# Patient Record
Sex: MALE | Race: WHITE | NOT HISPANIC OR LATINO | Employment: OTHER | URBAN - METROPOLITAN AREA
[De-identification: names, ages, dates, MRNs, and addresses within clinical notes are randomized per-mention and may not be internally consistent; named-entity substitution may affect disease eponyms.]

---

## 2017-03-23 ENCOUNTER — CONVERSION ENCOUNTER (OUTPATIENT)
Dept: OTHER | Facility: HOSPITAL | Age: 72
End: 2017-03-23

## 2017-07-24 ENCOUNTER — HOSPITAL ENCOUNTER (OUTPATIENT)
Dept: OTHER | Facility: HOSPITAL | Age: 72
Discharge: HOME | End: 2017-07-24
Attending: INTERNAL MEDICINE | Admitting: INTERNAL MEDICINE

## 2017-12-11 ENCOUNTER — OFFICE (AMBULATORY)
Dept: URBAN - METROPOLITAN AREA CLINIC 64 | Facility: CLINIC | Age: 72
End: 2017-12-11

## 2017-12-11 VITALS
HEIGHT: 71 IN | SYSTOLIC BLOOD PRESSURE: 131 MMHG | HEART RATE: 80 BPM | WEIGHT: 154 LBS | DIASTOLIC BLOOD PRESSURE: 80 MMHG

## 2017-12-11 DIAGNOSIS — K62.5 HEMORRHAGE OF ANUS AND RECTUM: ICD-10-CM

## 2017-12-11 LAB
CBC WITH DIFFERENTIAL/PLATELET: BASO (ABSOLUTE): 0 X10E3/UL (ref 0–0.2)
CBC WITH DIFFERENTIAL/PLATELET: BASOS: 0 %
CBC WITH DIFFERENTIAL/PLATELET: EOS (ABSOLUTE): 0.1 X10E3/UL (ref 0–0.4)
CBC WITH DIFFERENTIAL/PLATELET: EOS: 2 %
CBC WITH DIFFERENTIAL/PLATELET: HEMATOCRIT: 39.9 % (ref 37.5–51)
CBC WITH DIFFERENTIAL/PLATELET: HEMATOLOGY COMMENTS: (no result)
CBC WITH DIFFERENTIAL/PLATELET: HEMOGLOBIN: 13 G/DL (ref 13–17.7)
CBC WITH DIFFERENTIAL/PLATELET: IMMATURE CELLS: (no result)
CBC WITH DIFFERENTIAL/PLATELET: IMMATURE GRANS (ABS): 0 X10E3/UL (ref 0–0.1)
CBC WITH DIFFERENTIAL/PLATELET: IMMATURE GRANULOCYTES: 0 %
CBC WITH DIFFERENTIAL/PLATELET: LYMPHS (ABSOLUTE): 1 X10E3/UL (ref 0.7–3.1)
CBC WITH DIFFERENTIAL/PLATELET: LYMPHS: 17 %
CBC WITH DIFFERENTIAL/PLATELET: MCH: 34.6 PG — HIGH (ref 26.6–33)
CBC WITH DIFFERENTIAL/PLATELET: MCHC: 32.6 G/DL (ref 31.5–35.7)
CBC WITH DIFFERENTIAL/PLATELET: MCV: 106 FL — HIGH (ref 79–97)
CBC WITH DIFFERENTIAL/PLATELET: MONOCYTES(ABSOLUTE): 0.5 X10E3/UL (ref 0.1–0.9)
CBC WITH DIFFERENTIAL/PLATELET: MONOCYTES: 9 %
CBC WITH DIFFERENTIAL/PLATELET: NEUTROPHILS (ABSOLUTE): 4.2 X10E3/UL (ref 1.4–7)
CBC WITH DIFFERENTIAL/PLATELET: NEUTROPHILS: 72 %
CBC WITH DIFFERENTIAL/PLATELET: NRBC: (no result)
CBC WITH DIFFERENTIAL/PLATELET: PLATELETS: 126 X10E3/UL — LOW (ref 150–379)
CBC WITH DIFFERENTIAL/PLATELET: RBC: 3.76 X10E6/UL — LOW (ref 4.14–5.8)
CBC WITH DIFFERENTIAL/PLATELET: RDW: 13 % (ref 12.3–15.4)
CBC WITH DIFFERENTIAL/PLATELET: WBC: 5.8 X10E3/UL (ref 3.4–10.8)
COMP. METABOLIC PANEL (14): A/G RATIO: 1.9 (ref 1.2–2.2)
COMP. METABOLIC PANEL (14): ALBUMIN, SERUM: 4.7 G/DL (ref 3.5–4.8)
COMP. METABOLIC PANEL (14): ALKALINE PHOSPHATASE, S: 88 IU/L (ref 39–117)
COMP. METABOLIC PANEL (14): ALT (SGPT): 17 IU/L (ref 0–44)
COMP. METABOLIC PANEL (14): AST (SGOT): 26 IU/L (ref 0–40)
COMP. METABOLIC PANEL (14): BILIRUBIN, TOTAL: 0.4 MG/DL (ref 0–1.2)
COMP. METABOLIC PANEL (14): BUN/CREATININE RATIO: 21 (ref 10–24)
COMP. METABOLIC PANEL (14): BUN: 28 MG/DL — HIGH (ref 8–27)
COMP. METABOLIC PANEL (14): CALCIUM, SERUM: 9.5 MG/DL (ref 8.6–10.2)
COMP. METABOLIC PANEL (14): CARBON DIOXIDE, TOTAL: 26 MMOL/L (ref 18–29)
COMP. METABOLIC PANEL (14): CHLORIDE, SERUM: 101 MMOL/L (ref 96–106)
COMP. METABOLIC PANEL (14): CREATININE, SERUM: 1.34 MG/DL — HIGH (ref 0.76–1.27)
COMP. METABOLIC PANEL (14): EGFR IF AFRICN AM: 62 ML/MIN/1.73 (ref 59–?)
COMP. METABOLIC PANEL (14): EGFR IF NONAFRICN AM: 53 ML/MIN/1.73 — LOW (ref 59–?)
COMP. METABOLIC PANEL (14): GLOBULIN, TOTAL: 2.5 G/DL (ref 1.5–4.5)
COMP. METABOLIC PANEL (14): GLUCOSE, SERUM: 96 MG/DL (ref 65–99)
COMP. METABOLIC PANEL (14): POTASSIUM, SERUM: 4.3 MMOL/L (ref 3.5–5.2)
COMP. METABOLIC PANEL (14): PROTEIN, TOTAL, SERUM: 7.2 G/DL (ref 6–8.5)
COMP. METABOLIC PANEL (14): SODIUM, SERUM: 143 MMOL/L (ref 134–144)

## 2017-12-11 PROCEDURE — 99202 OFFICE O/P NEW SF 15 MIN: CPT | Performed by: NURSE PRACTITIONER

## 2017-12-11 RX ORDER — HYDROCORTISONE 25 MG/G
5 CREAM TOPICAL
Qty: 1 | Refills: 1 | Status: COMPLETED
Start: 2017-12-11 | End: 2018-10-18

## 2017-12-14 ENCOUNTER — HOSPITAL ENCOUNTER (OUTPATIENT)
Dept: CT IMAGING | Facility: HOSPITAL | Age: 72
Discharge: HOME | End: 2017-12-14
Attending: NURSE PRACTITIONER | Admitting: NURSE PRACTITIONER

## 2018-01-25 ENCOUNTER — OFFICE (AMBULATORY)
Dept: URBAN - METROPOLITAN AREA CLINIC 64 | Facility: CLINIC | Age: 73
End: 2018-01-25
Payer: COMMERCIAL

## 2018-01-25 VITALS
DIASTOLIC BLOOD PRESSURE: 80 MMHG | SYSTOLIC BLOOD PRESSURE: 137 MMHG | HEIGHT: 71 IN | WEIGHT: 148 LBS | HEART RATE: 66 BPM

## 2018-01-25 DIAGNOSIS — K62.5 HEMORRHAGE OF ANUS AND RECTUM: ICD-10-CM

## 2018-01-25 DIAGNOSIS — K59.00 CONSTIPATION, UNSPECIFIED: ICD-10-CM

## 2018-01-25 PROCEDURE — 99212 OFFICE O/P EST SF 10 MIN: CPT | Performed by: INTERNAL MEDICINE

## 2018-03-29 ENCOUNTER — CONVERSION ENCOUNTER (OUTPATIENT)
Dept: OTHER | Facility: HOSPITAL | Age: 73
End: 2018-03-29

## 2018-10-19 ENCOUNTER — ON CAMPUS - OUTPATIENT (AMBULATORY)
Dept: URBAN - METROPOLITAN AREA HOSPITAL 2 | Facility: HOSPITAL | Age: 73
End: 2018-10-19

## 2018-10-19 VITALS
SYSTOLIC BLOOD PRESSURE: 145 MMHG | DIASTOLIC BLOOD PRESSURE: 72 MMHG | HEIGHT: 71 IN | WEIGHT: 177 LBS | HEART RATE: 77 BPM | DIASTOLIC BLOOD PRESSURE: 76 MMHG | HEART RATE: 75 BPM | HEART RATE: 92 BPM | TEMPERATURE: 97.3 F | SYSTOLIC BLOOD PRESSURE: 119 MMHG | SYSTOLIC BLOOD PRESSURE: 104 MMHG | DIASTOLIC BLOOD PRESSURE: 79 MMHG | DIASTOLIC BLOOD PRESSURE: 78 MMHG | HEART RATE: 78 BPM | HEART RATE: 85 BPM | DIASTOLIC BLOOD PRESSURE: 68 MMHG | RESPIRATION RATE: 16 BRPM | RESPIRATION RATE: 19 BRPM | SYSTOLIC BLOOD PRESSURE: 111 MMHG | DIASTOLIC BLOOD PRESSURE: 81 MMHG | HEART RATE: 79 BPM | DIASTOLIC BLOOD PRESSURE: 67 MMHG | OXYGEN SATURATION: 95 % | SYSTOLIC BLOOD PRESSURE: 150 MMHG | DIASTOLIC BLOOD PRESSURE: 73 MMHG | RESPIRATION RATE: 18 BRPM | SYSTOLIC BLOOD PRESSURE: 110 MMHG | SYSTOLIC BLOOD PRESSURE: 117 MMHG | OXYGEN SATURATION: 96 % | HEART RATE: 89 BPM | SYSTOLIC BLOOD PRESSURE: 128 MMHG | SYSTOLIC BLOOD PRESSURE: 122 MMHG | OXYGEN SATURATION: 98 %

## 2018-10-19 DIAGNOSIS — Z86.010 PERSONAL HISTORY OF COLONIC POLYPS: ICD-10-CM

## 2018-10-19 DIAGNOSIS — K57.30 DIVERTICULOSIS OF LARGE INTESTINE WITHOUT PERFORATION OR ABS: ICD-10-CM

## 2018-10-19 PROCEDURE — 45378 DIAGNOSTIC COLONOSCOPY: CPT | Performed by: INTERNAL MEDICINE

## 2019-03-29 ENCOUNTER — CONVERSION ENCOUNTER (OUTPATIENT)
Dept: OTHER | Facility: HOSPITAL | Age: 74
End: 2019-03-29

## 2019-06-04 VITALS
BODY MASS INDEX: 22 KG/M2 | WEIGHT: 157.13 LBS | HEART RATE: 74 BPM | DIASTOLIC BLOOD PRESSURE: 78 MMHG | HEIGHT: 71 IN | HEART RATE: 87 BPM | DIASTOLIC BLOOD PRESSURE: 71 MMHG | WEIGHT: 140 LBS | HEIGHT: 71 IN | SYSTOLIC BLOOD PRESSURE: 119 MMHG | HEART RATE: 91 BPM | BODY MASS INDEX: 24.69 KG/M2 | DIASTOLIC BLOOD PRESSURE: 80 MMHG | SYSTOLIC BLOOD PRESSURE: 118 MMHG | WEIGHT: 176.38 LBS | SYSTOLIC BLOOD PRESSURE: 136 MMHG

## 2019-06-12 ENCOUNTER — DOCUMENTATION (OUTPATIENT)
Dept: CARDIOLOGY | Facility: CLINIC | Age: 74
End: 2019-06-12

## 2019-06-12 DIAGNOSIS — E78.2 MIXED HYPERLIPIDEMIA: Primary | ICD-10-CM

## 2019-06-12 DIAGNOSIS — I10 ESSENTIAL HYPERTENSION: ICD-10-CM

## 2019-06-12 DIAGNOSIS — I25.10 CORONARY ARTERY DISEASE DUE TO LIPID RICH PLAQUE: ICD-10-CM

## 2019-06-12 DIAGNOSIS — G47.33 OSA (OBSTRUCTIVE SLEEP APNEA): ICD-10-CM

## 2019-06-12 DIAGNOSIS — I25.83 CORONARY ARTERY DISEASE DUE TO LIPID RICH PLAQUE: ICD-10-CM

## 2019-06-12 DIAGNOSIS — I27.20 PULMONARY HYPERTENSION (HCC): ICD-10-CM

## 2019-06-12 DIAGNOSIS — I35.0 NONRHEUMATIC AORTIC VALVE STENOSIS: Primary | ICD-10-CM

## 2019-06-12 DIAGNOSIS — I35.0 AORTIC STENOSIS, SEVERE: ICD-10-CM

## 2019-06-12 NOTE — PROGRESS NOTES
"  Vitals:  Weight: 178 lbs  Height: 5'6\"  /72  HR: 88 bpm  Resp: 18    Subjective:     Encounter Date:06/12/2019      Patient ID: Berto Cain is a 74 y.o. male.    Chief Complaint:  No chief complaint on file.      HPI:  Berto is a 74-year-old male patient of mine.  He previously was diagnosed with critical aortic stenosis and underwent transcatheter aortic valve replacement by me 6/2/2016.  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  His coronary artery risk factors are significant for dyslipidemia diabetes mellitus hypertension.  He also has a history of obstructive sleep apnea.    6/29/2018 he was complaining of dizziness.  He underwent a ZIO Patch 14-day evaluation and was found to have 11 runs of supraventricular tachycardia the worst 4 beats total.  This was determined to be a medically managed arrhythmia.  He underwent echocardiographic evaluation in 10/24/2018 which I personally reviewed: He had an ejection fraction of 60 to 65% with normal RV mild left atrial enlargement with normal bioprosthetic leaflet mobility with a mild paravalvular leak.    He returns today for follow-up.  He has mild exertional dyspnea.  His dyspnea occurs only with exertion and resolves with rest.  He has no associated chest pain or diaphoresis.    The following portions of the patient's history were reviewed and updated as appropriate: allergies, current medications, past family history, past medical history, past social history, past surgical history and problem list.    Problem List:  Patient Active Problem List   Diagnosis   • ISIDRA (obstructive sleep apnea)   • Diabetes mellitus (CMS/HCC)   • Hyperlipidemia   • Hypertension   • Pulmonary hypertension (CMS/HCC)   • Coronary artery disease due to lipid rich plaque   • Aortic stenosis, severe       Past Medical History:  Past Medical History:   Diagnosis Date   • Cancer (CMS/HCC)     Leukemia   • Diabetes mellitus (CMS/HCC)    • " History of cardiac monitoring 06/29/2018    Underlying NSR, Avg HR 91 bpm, 11 SVT runs with the fastest lasting 4 beats at 250 bpm.   • History of echocardiogram 10/24/2018    TTE showed EF 60-65%, Normal RV, Mild LAE, TAVR leaflets appear to move well, mild paul-valvular regurg, mild MR/TR, RVSP 35 mmHg.   • Hyperlipidemia    • Hypertension    • ISIDRA (obstructive sleep apnea)    • Pulmonary hypertension (CMS/HCC)        Past Surgical History:  Past Surgical History:   Procedure Laterality Date   • CARDIAC VALVE REPLACEMENT  06/02/2016    TAVR-Dr. Maldonado   • LIVER BIOPSY     • TONSILLECTOMY         Social History:  Social History     Socioeconomic History   • Marital status:      Spouse name: Not on file   • Number of children: Not on file   • Years of education: Not on file   • Highest education level: Not on file   Tobacco Use   • Smoking status: Never Smoker   • Smokeless tobacco: Never Used   Substance and Sexual Activity   • Alcohol use: No   • Drug use: No       Allergies:  Allergies no known allergies      ROS:    Review of Symptoms:  Constitutional: Patient afebrile no chills or unexpected weight changes  Respiratory: No cough, no wheezing or dyspnea  Cardiovascular: No chest pain, palpitations, +dyspnea, orthopnea and no edema  Gastrointestinal: No nausea, vomiting, constipation or diarrhea.  No melena or dark stools    All other systems reviewed and are negative         Objective:         There were no vitals taken for this visit.    Physical exam  Constitutional: well-nourished, and appears stated age in no acute distress  PERRL: Conjunctiva clear, no pallor, anicteric  HENMT: normocephalic, normal dentition, no cyanosis or pallor  Neck:no bruits, or thrills and bilateral normal carotid upstroke. Normal jugular venous pressure  Cardiovascular: No parasternal heaves an non-displaced focal PMI. Normal rate and rhythm: no rub, gallop,II/VI JOY with loud S2 and normal S1; no lower or upper extremity  edema.   Lungs: unlabored, no wheezing with no rales or rhonchi on auscultation.  Extremities: Warm, no clubbing, cyanosis, or edema. Full and equal peripheral pulses in extremities with no bruits appreciated.   Abdomen: soft, non-tender, non-distended  Musculoskeletal: no joint tenderness or swelling and no erythema  Skin: Warm and dry, non-erythematous   Neuro:alert and normal affect. Oriented to time, place and person.         In-Office Procedure(s):  Procedures    ASCVD RIsk Score::  The ASCVD Risk score (Oren KASH Maki, et al., 2013) failed to calculate for the following reasons:    Cannot find a previous HDL lab    Cannot find a previous total cholesterol lab    Recent Radiology:  Imaging Results (most recent)     None          Lab Review:   No visits with results within 2 Month(s) from this visit.   Latest known visit with results is:   Admission on 12/08/2017, Discharged on 12/08/2017   Component Date Value   • WBC 12/08/2017 6.7    • RBC 12/08/2017 3.63*   • Hemoglobin 12/08/2017 12.9*   • Hematocrit 12/08/2017 38.2*   • MCV 12/08/2017 105.0*   • MCH 12/08/2017 35.5*   • MCHC 12/08/2017 33.8    • RDW 12/08/2017 12.8    • Platelets 12/08/2017 90*   • MPV 12/08/2017 7.5    • Differential Type 12/08/2017 AUTO    • Neutrophils Absolute 12/08/2017 5.2    • Lymphocytes Absolute 12/08/2017 0.6*   • Monocytes Absolute 12/08/2017 0.8    • Eosinophils Absolute 12/08/2017 0.2    • Basophils Absolute 12/08/2017 0.0    • Neutrophil Rel % 12/08/2017 78*   • Lymphocyte Rel % 12/08/2017 9*   • Monocyte Rel % 12/08/2017 11    • Eosinophil Rel % 12/08/2017 2    • Basophil Rel % 12/08/2017 0    • nRBC 12/08/2017 0    • Absolute nRBC 12/08/2017 0    • Sodium 12/08/2017 139    • Potassium 12/08/2017 3.8    • Chloride 12/08/2017 103    • CO2 12/08/2017 28    • Glucose 12/08/2017 86    • BUN 12/08/2017 20    • Creatinine 12/08/2017 1.2    • Anion Gap 12/08/2017 11.8    • BUN/Creatinine Ratio 12/08/2017 16.7    • GFR MDRD Non   [place]* 12/08/2017 60*   • GFR MDRD  12/08/2017 >60    • Calcium 12/08/2017 9.3    • Protime 12/08/2017 10.5    • INR 12/08/2017 1.0    • PTT 12/08/2017 24.9               Invalid input(s): ALKPO4                        Invalid input(s): LDLCALC                Assessment:          Diagnosis Plan   1. Mixed hyperlipidemia     2. Essential hypertension     3. Pulmonary hypertension (CMS/HCC)     4. ISIDRA (obstructive sleep apnea)     5. Coronary artery disease due to lipid rich plaque     6. Aortic stenosis, severe            Plan:         1. Mixed hyperlipidemia  Controlled on medical therapy    2. Essential hypertension  Well-controlled on medical therapy    3. Pulmonary hypertension (CMS/HCC)  Treating conservatively    4. ISIDRA (obstructive sleep apnea)  Using CPAP    5. Coronary artery disease due to lipid rich plaque  Clinically silent and nonobstructive with last cardiac catheterization data.  Continue medical therapy for primary prevention of ischemic heart disease    6. Aortic stenosis, severe  Stable status post transcatheter aortic valve replacement.  Given dyspnea we will repeat echo today.    Level of Care:                 Karson Maldonado MD  06/12/19  .

## 2019-06-19 ENCOUNTER — HOSPITAL ENCOUNTER (OUTPATIENT)
Dept: CARDIOLOGY | Facility: HOSPITAL | Age: 74
Discharge: HOME | End: 2019-06-19
Admitting: INTERNAL MEDICINE

## 2019-06-19 VITALS
HEIGHT: 70 IN | DIASTOLIC BLOOD PRESSURE: 67 MMHG | WEIGHT: 175 LBS | SYSTOLIC BLOOD PRESSURE: 119 MMHG | BODY MASS INDEX: 25.05 KG/M2

## 2019-06-19 DIAGNOSIS — I35.0 NONRHEUMATIC AORTIC VALVE STENOSIS: ICD-10-CM

## 2019-06-19 PROCEDURE — 93306 TTE W/DOPPLER COMPLETE: CPT | Performed by: INTERNAL MEDICINE

## 2019-06-19 PROCEDURE — 93306 TTE W/DOPPLER COMPLETE: CPT

## 2019-06-23 LAB
BH CV ECHO MEAS - AO MAX PG (FULL): 31 MMHG
BH CV ECHO MEAS - AO MAX PG: 34.2 MMHG
BH CV ECHO MEAS - AO MEAN PG (FULL): 17.8 MMHG
BH CV ECHO MEAS - AO MEAN PG: 19.6 MMHG
BH CV ECHO MEAS - AO ROOT AREA (BSA CORRECTED): 1.3
BH CV ECHO MEAS - AO ROOT AREA: 5.4 CM^2
BH CV ECHO MEAS - AO ROOT DIAM: 2.6 CM
BH CV ECHO MEAS - AO V2 MAX: 292.5 CM/SEC
BH CV ECHO MEAS - AO V2 MEAN: 211.8 CM/SEC
BH CV ECHO MEAS - AO V2 VTI: 68.3 CM
BH CV ECHO MEAS - AVA(I,A): 0.93 CM^2
BH CV ECHO MEAS - AVA(I,D): 0.93 CM^2
BH CV ECHO MEAS - AVA(V,A): 0.93 CM^2
BH CV ECHO MEAS - AVA(V,D): 0.93 CM^2
BH CV ECHO MEAS - BSA(HAYCOCK): 2 M^2
BH CV ECHO MEAS - BSA: 2 M^2
BH CV ECHO MEAS - BZI_BMI: 24.8 KILOGRAMS/M^2
BH CV ECHO MEAS - BZI_METRIC_HEIGHT: 180.3 CM
BH CV ECHO MEAS - BZI_METRIC_WEIGHT: 80.7 KG
BH CV ECHO MEAS - EDV(CUBED): 137.3 ML
BH CV ECHO MEAS - EDV(MOD-SP4): 58.7 ML
BH CV ECHO MEAS - EDV(TEICH): 127.1 ML
BH CV ECHO MEAS - EF(CUBED): 62.1 %
BH CV ECHO MEAS - EF(MOD-BP): 61 %
BH CV ECHO MEAS - EF(MOD-SP4): 61.2 %
BH CV ECHO MEAS - EF(TEICH): 53.3 %
BH CV ECHO MEAS - ESV(CUBED): 52.1 ML
BH CV ECHO MEAS - ESV(MOD-SP4): 22.8 ML
BH CV ECHO MEAS - ESV(TEICH): 59.4 ML
BH CV ECHO MEAS - FS: 27.6 %
BH CV ECHO MEAS - IVS/LVPW: 1.2
BH CV ECHO MEAS - IVSD: 1.2 CM
BH CV ECHO MEAS - LA DIMENSION(2D): 4.2 CM
BH CV ECHO MEAS - LV DIASTOLIC VOL/BSA (35-75): 29.3 ML/M^2
BH CV ECHO MEAS - LV MASS(C)D: 217.8 GRAMS
BH CV ECHO MEAS - LV MASS(C)DI: 108.5 GRAMS/M^2
BH CV ECHO MEAS - LV MAX PG: 3.3 MMHG
BH CV ECHO MEAS - LV MEAN PG: 1.8 MMHG
BH CV ECHO MEAS - LV SYSTOLIC VOL/BSA (12-30): 11.4 ML/M^2
BH CV ECHO MEAS - LV V1 MAX: 90.4 CM/SEC
BH CV ECHO MEAS - LV V1 MEAN: 61 CM/SEC
BH CV ECHO MEAS - LV V1 VTI: 21.1 CM
BH CV ECHO MEAS - LVIDD: 5.2 CM
BH CV ECHO MEAS - LVIDS: 3.7 CM
BH CV ECHO MEAS - LVOT AREA: 3 CM^2
BH CV ECHO MEAS - LVOT DIAM: 2 CM
BH CV ECHO MEAS - LVPWD: 1 CM
BH CV ECHO MEAS - MV A MAX VEL: 85.3 CM/SEC
BH CV ECHO MEAS - MV DEC SLOPE: 520.3 CM/SEC^2
BH CV ECHO MEAS - MV DEC TIME: 0.19 SEC
BH CV ECHO MEAS - MV E MAX VEL: 100.9 CM/SEC
BH CV ECHO MEAS - MV E/A: 1.2
BH CV ECHO MEAS - MV MAX PG: 5.7 MMHG
BH CV ECHO MEAS - MV MEAN PG: 2.2 MMHG
BH CV ECHO MEAS - MV V2 MAX: 119.2 CM/SEC
BH CV ECHO MEAS - MV V2 MEAN: 68.7 CM/SEC
BH CV ECHO MEAS - MV V2 VTI: 34 CM
BH CV ECHO MEAS - MVA(VTI): 1.9 CM^2
BH CV ECHO MEAS - PA MAX PG (FULL): 1.2 MMHG
BH CV ECHO MEAS - PA MAX PG: 3.7 MMHG
BH CV ECHO MEAS - PA V2 MAX: 96.2 CM/SEC
BH CV ECHO MEAS - PULM DIAS VEL: 68.1 CM/SEC
BH CV ECHO MEAS - PULM S/D: 1.3
BH CV ECHO MEAS - PULM SYS VEL: 85.6 CM/SEC
BH CV ECHO MEAS - PVA(V,A): 1.3 CM^2
BH CV ECHO MEAS - PVA(V,D): 1.3 CM^2
BH CV ECHO MEAS - QP/QS: 0.39
BH CV ECHO MEAS - RV MAX PG: 2.5 MMHG
BH CV ECHO MEAS - RV MEAN PG: 1.4 MMHG
BH CV ECHO MEAS - RV V1 MAX: 79.7 CM/SEC
BH CV ECHO MEAS - RV V1 MEAN: 55.6 CM/SEC
BH CV ECHO MEAS - RV V1 VTI: 16.4 CM
BH CV ECHO MEAS - RVDD: 2.2 CM
BH CV ECHO MEAS - RVOT AREA: 1.5 CM^2
BH CV ECHO MEAS - RVOT DIAM: 1.4 CM
BH CV ECHO MEAS - SI(AO): 183 ML/M^2
BH CV ECHO MEAS - SI(CUBED): 42.5 ML/M^2
BH CV ECHO MEAS - SI(LVOT): 31.6 ML/M^2
BH CV ECHO MEAS - SI(MOD-SP4): 17.9 ML/M^2
BH CV ECHO MEAS - SI(TEICH): 33.7 ML/M^2
BH CV ECHO MEAS - SV(AO): 367.3 ML
BH CV ECHO MEAS - SV(CUBED): 85.2 ML
BH CV ECHO MEAS - SV(LVOT): 63.4 ML
BH CV ECHO MEAS - SV(MOD-SP4): 36 ML
BH CV ECHO MEAS - SV(RVOT): 24.8 ML
BH CV ECHO MEAS - SV(TEICH): 67.7 ML
LEFT ATRIUM VOLUME INDEX: 29 ML/M2

## 2019-06-26 ENCOUNTER — TELEPHONE (OUTPATIENT)
Dept: CARDIOLOGY | Facility: CLINIC | Age: 74
End: 2019-06-26

## 2019-06-27 ENCOUNTER — TELEPHONE (OUTPATIENT)
Dept: CARDIOLOGY | Facility: CLINIC | Age: 74
End: 2019-06-27

## 2020-01-10 ENCOUNTER — OFFICE VISIT (OUTPATIENT)
Dept: CARDIOLOGY | Facility: CLINIC | Age: 75
End: 2020-01-10

## 2020-01-10 VITALS
HEART RATE: 86 BPM | BODY MASS INDEX: 25.2 KG/M2 | RESPIRATION RATE: 18 BRPM | HEIGHT: 70 IN | OXYGEN SATURATION: 93 % | WEIGHT: 176 LBS | DIASTOLIC BLOOD PRESSURE: 78 MMHG | SYSTOLIC BLOOD PRESSURE: 136 MMHG

## 2020-01-10 DIAGNOSIS — I27.20 PULMONARY HYPERTENSION (HCC): ICD-10-CM

## 2020-01-10 DIAGNOSIS — I35.0 AORTIC STENOSIS, SEVERE: Primary | ICD-10-CM

## 2020-01-10 DIAGNOSIS — Z95.2 AORTIC VALVE REPLACED: ICD-10-CM

## 2020-01-10 DIAGNOSIS — R07.89 CHEST PAIN, ATYPICAL: ICD-10-CM

## 2020-01-10 DIAGNOSIS — E78.2 MIXED HYPERLIPIDEMIA: ICD-10-CM

## 2020-01-10 DIAGNOSIS — I25.10 CORONARY ARTERY DISEASE DUE TO LIPID RICH PLAQUE: ICD-10-CM

## 2020-01-10 DIAGNOSIS — I10 ESSENTIAL HYPERTENSION: ICD-10-CM

## 2020-01-10 DIAGNOSIS — I25.83 CORONARY ARTERY DISEASE DUE TO LIPID RICH PLAQUE: ICD-10-CM

## 2020-01-10 PROCEDURE — 99214 OFFICE O/P EST MOD 30 MIN: CPT | Performed by: INTERNAL MEDICINE

## 2020-01-10 RX ORDER — HYDROCORTISONE 5 MG/1
5 TABLET ORAL DAILY
COMMUNITY
End: 2021-04-23

## 2020-01-10 RX ORDER — VIT C/B6/B5/MAGNESIUM/HERB 173 50-5-6-5MG
1 CAPSULE ORAL 2 TIMES DAILY
COMMUNITY

## 2020-01-11 PROBLEM — R07.89 CHEST PAIN, ATYPICAL: Status: ACTIVE | Noted: 2020-01-11

## 2020-01-11 NOTE — PROGRESS NOTES
Subjective:     Encounter Date:01/10/2020      Patient ID: Berto Cain is a 74 y.o. male.    Chief Complaint:  Chief Complaint   Patient presents with   • Coronary Artery Disease   • Hypertension   • Hyperlipidemia   • Cardiac Valve Problem     AS       HPI:  Berto is a 74-year-old male patient of mine.  He previously was diagnosed with critical aortic stenosis and underwent transcatheter aortic valve replacement by me 6/2/2016.  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  His coronary artery risk factors are significant for dyslipidemia diabetes mellitus hypertension.  He also has a history of obstructive sleep apnea.    6/29/2018 he was complaining of dizziness.  He underwent a ZIO Patch 14-day evaluation and was found to have 11 runs of supraventricular tachycardia the worst 4 beats total.  This was determined to be a medically managed arrhythmia.  He underwent echocardiographic evaluation in 10/24/2018 which I personally reviewed: He had an ejection fraction of 60 to 65% with normal RV mild left atrial enlargement with normal bioprosthetic leaflet mobility with a mild paravalvular leak.    Last visit he had mild exertional dyspnea.  His dyspnea occurs only with exertion and resolves with rest.  He has no associated chest pain or diaphoresis.    Today he presents with one episode of chest pain which is a new complaint to me.  It happened during sleep after rolling over lasted approximately 30 seconds and resolved.  Is not recurred.  He otherwise has no complaints from a cardiovascular standpoint.  I personally reviewed his echocardiogram from 6/19/2019 showed mild concentric left ventricular hypertrophy mild MR and TR with mild paravalvular leak around his transcatheter aortic valve.  His ejection fraction was 61%.    The following portions of the patient's history were reviewed and updated as appropriate: allergies, current medications, past family history, past  medical history, past social history and past surgical history.    Problem List:  Patient Active Problem List   Diagnosis   • ISIDRA (obstructive sleep apnea)   • Diabetes mellitus (CMS/HCC)   • Hyperlipidemia   • Hypertension   • Pulmonary hypertension (CMS/HCC)   • Coronary artery disease due to lipid rich plaque   • Aortic stenosis, severe   • Chest pain, atypical       Past Medical History:  Past Medical History:   Diagnosis Date   • Cancer (CMS/HCC)     Leukemia   • Chest pain, atypical 1/11/2020   • Diabetes mellitus (CMS/HCC)    • History of cardiac monitoring 06/29/2018    Underlying NSR, Avg HR 91 bpm, 11 SVT runs with the fastest lasting 4 beats at 250 bpm.   • History of echocardiogram 10/24/2018    TTE showed EF 60-65%, Normal RV, Mild LAE, TAVR leaflets appear to move well, mild paul-valvular regurg, mild MR/TR, RVSP 35 mmHg.   • History of echocardiogram 06/19/2019    Mild concentric LVH, Mild MR/TR, TAVR in place with mild paravalvular AI, EF 61%   • Hyperlipidemia    • Hypertension    • ISIDRA (obstructive sleep apnea)    • Pulmonary hypertension (CMS/HCC)        Past Surgical History:  Past Surgical History:   Procedure Laterality Date   • CARDIAC VALVE REPLACEMENT  06/02/2016    TAVR-Dr. Maldonado   • LIVER BIOPSY     • TONSILLECTOMY         Social History:  Social History     Socioeconomic History   • Marital status:      Spouse name: Not on file   • Number of children: Not on file   • Years of education: Not on file   • Highest education level: Not on file   Tobacco Use   • Smoking status: Never Smoker   • Smokeless tobacco: Never Used   Substance and Sexual Activity   • Alcohol use: No   • Drug use: No       Allergies:  No Known Allergies      Review of Symptoms:  Constitutional: Patient afebrile no chills or unexpected weight changes  Respiratory: No cough, no wheezing or dyspnea  Cardiovascular: No chest pain today, or palpitations, dyspnea, orthopnea and no edema  Gastrointestinal: No nausea,  "vomiting, constipation or diarrhea.  No melena or dark stools    All other systems reviewed and are negative           Objective:         /78 (BP Location: Left arm, Patient Position: Sitting, Cuff Size: Large Adult)   Pulse 86   Resp 18   Ht 177.8 cm (70\")   Wt 79.8 kg (176 lb)   SpO2 93%   BMI 25.25 kg/m²     Physical exam  Constitutional: well-nourished, and appears stated age in no acute distress  PERRL: Conjunctiva clear, no pallor, anicteric  HENMT: normocephalic, normal dentition, no cyanosis or pallor  Neck:no bruits, or thrills and bilateral normal carotid upstroke. Normal jugular venous pressure  Cardiovascular: No parasternal heaves an non-displaced focal PMI. Normal rate and rhythm: no rub, gallop, murmur or click and normal S1 and S2; no lower or upper extremity edema.   Lungs: unlabored, no wheezing with no rales or rhonchi on auscultation.  Extremities: Warm, no clubbing, cyanosis. Full and equal peripheral pulses in extremities with no bruits appreciated.   Abdomen: soft, non-tender, non-distended  Musculoskeletal: no joint tenderness or swelling and no erythema  Skin: Warm and dry, non-erythematous   Neuro:alert and normal affect. Oriented to time, place and person.       In-Office Procedure(s):  Procedures    ASCVD RIsk Score::  The ASCVD Risk score (Rockford DC Jr., et al., 2013) failed to calculate for the following reasons:    Cannot find a previous HDL lab    Cannot find a previous total cholesterol lab    Recent Radiology:  Imaging Results (Most Recent)     None          Lab Review:   No visits with results within 2 Month(s) from this visit.   Latest known visit with results is:   Hospital Outpatient Visit on 06/19/2019   Component Date Value   • BSA 06/19/2019 2.0    • BH CV ECHO JONNATHAN - RVDD 06/19/2019 2.2    • IVSd 06/19/2019 1.2    • LVIDd 06/19/2019 5.2    • LVIDs 06/19/2019 3.7    • LVPWd 06/19/2019 1.0    • IVS/LVPW 06/19/2019 1.2    • FS 06/19/2019 27.6    • EDV(Teich) " 06/19/2019 127.1    • ESV(Teich) 06/19/2019 59.4    • EF(Teich) 06/19/2019 53.3    • EDV(cubed) 06/19/2019 137.3    • ESV(cubed) 06/19/2019 52.1    • EF(cubed) 06/19/2019 62.1    • LV mass(C)d 06/19/2019 217.8    • LV mass(C)dI 06/19/2019 108.5    • SV(Teich) 06/19/2019 67.7    • SI(Teich) 06/19/2019 33.7    • SV(cubed) 06/19/2019 85.2    • SI(cubed) 06/19/2019 42.5    • Ao root diam 06/19/2019 2.6    • Ao root area 06/19/2019 5.4    • LVOT diam 06/19/2019 2.0    • LVOT area 06/19/2019 3.0    • RVOT diam 06/19/2019 1.4    • RVOT area 06/19/2019 1.5    • EDV(MOD-sp4) 06/19/2019 58.7    • ESV(MOD-sp4) 06/19/2019 22.8    • EF(MOD-sp4) 06/19/2019 61.2    • SV(MOD-sp4) 06/19/2019 36.0    • SI(MOD-sp4) 06/19/2019 17.9    • Ao root area (BSA correc* 06/19/2019 1.3    • LV Cornell Vol (BSA correct* 06/19/2019 29.3    • LV Sys Vol (BSA correcte* 06/19/2019 11.4    • MV E max surendra 06/19/2019 100.9    • MV A max surendra 06/19/2019 85.3    • MV E/A 06/19/2019 1.2    • MV V2 max 06/19/2019 119.2    • MV max PG 06/19/2019 5.7    • MV V2 mean 06/19/2019 68.7    • MV mean PG 06/19/2019 2.2    • MV V2 VTI 06/19/2019 34.0    • MVA(VTI) 06/19/2019 1.9    • MV dec slope 06/19/2019 520.3    • MV dec time 06/19/2019 0.19    • Ao pk surendra 06/19/2019 292.5    • Ao max PG 06/19/2019 34.2    • Ao max PG (full) 06/19/2019 31.0    • Ao V2 mean 06/19/2019 211.8    • Ao mean PG 06/19/2019 19.6    • Ao mean PG (full) 06/19/2019 17.8    • Ao V2 VTI 06/19/2019 68.3    • HAROLDO(I,A) 06/19/2019 0.93    • HAROLDO(I,D) 06/19/2019 0.93    • HAROLDO(V,A) 06/19/2019 0.93    • HAROLDO(V,D) 06/19/2019 0.93    • LV V1 max PG 06/19/2019 3.3    • LV V1 mean PG 06/19/2019 1.8    • LV V1 max 06/19/2019 90.4    • LV V1 mean 06/19/2019 61.0    • LV V1 VTI 06/19/2019 21.1    • SV(Ao) 06/19/2019 367.3    • SI(Ao) 06/19/2019 183.0    • SV(LVOT) 06/19/2019 63.4    • SV(RVOT) 06/19/2019 24.8    • SI(LVOT) 06/19/2019 31.6    • PA V2 max 06/19/2019 96.2    • PA max PG 06/19/2019 3.7    • PA max  PG (full) 06/19/2019 1.2    •  CV ECHO JONNATHAN - PVA(V,* 06/19/2019 1.3    •  CV ECHO JONNATHAN - PVA(V,* 06/19/2019 1.3    • RV V1 max PG 06/19/2019 2.5    • RV V1 mean PG 06/19/2019 1.4    • RV V1 max 06/19/2019 79.7    • RV V1 mean 06/19/2019 55.6    • RV V1 VTI 06/19/2019 16.4    • Pulm Sys Pérez 06/19/2019 85.6    • Pulm Cornell Pérez 06/19/2019 68.1    • Pulm S/D 06/19/2019 1.3    • Qp/Qs 06/19/2019 0.39    •  CV ECHO JONNATHAN - BZI_BMI 06/19/2019 24.8    •  CV ECHO JONNATHAN - BSA(HA* 06/19/2019 2.0    •  CV ECHO JONNATHAN - BZI_ME* 06/19/2019 80.7    •  CV ECHO JONNATHAN - BZI_ME* 06/19/2019 180.3    • LA Volume Index 06/19/2019 29.0    • EF(MOD-bp) 06/19/2019 61.0    • LA dimension(2D) 06/19/2019 4.2               Invalid input(s): ALKPO4                        Invalid input(s): LDLCALC                Assessment:          Diagnosis Plan   1. Aortic stenosis, severe  Adult Transthoracic Echo Complete W/ Cont if Necessary Per Protocol   2. Aortic valve replaced  Adult Transthoracic Echo Complete W/ Cont if Necessary Per Protocol   3. Coronary artery disease due to lipid rich plaque     4. Pulmonary hypertension (CMS/HCC)     5. Essential hypertension     6. Mixed hyperlipidemia     7. Chest pain, atypical            Plan:         1. Aortic stenosis, severe  Appears to be stable.  Repeat surveillance echo to evaluate paravalvular leak.  - Adult Transthoracic Echo Complete W/ Cont if Necessary Per Protocol; Future    2. Aortic valve replaced  See above  - Adult Transthoracic Echo Complete W/ Cont if Necessary Per Protocol; Future    3. Coronary artery disease due to lipid rich plaque  Chest pain likely noncardiac.  Continue medical therapy secondary prevention for the development of ischemic heart disease    4. Pulmonary hypertension (CMS/HCC)  Stable    5. Essential hypertension  Well-controlled on medical therapy    6. Mixed hyperlipidemia  Well-controlled on medical therapy    7. Chest pain, atypical  Atypical.  Treat  conservatively      Level of Care:                 Karson Maldonado MD  01/11/20  .

## 2020-01-16 ENCOUNTER — HOSPITAL ENCOUNTER (OUTPATIENT)
Dept: CARDIOLOGY | Facility: HOSPITAL | Age: 75
Discharge: HOME | End: 2020-01-16
Admitting: INTERNAL MEDICINE

## 2020-01-16 VITALS
WEIGHT: 177 LBS | SYSTOLIC BLOOD PRESSURE: 120 MMHG | DIASTOLIC BLOOD PRESSURE: 60 MMHG | BODY MASS INDEX: 24.78 KG/M2 | HEIGHT: 71 IN

## 2020-01-16 DIAGNOSIS — I35.0 AORTIC STENOSIS, SEVERE: ICD-10-CM

## 2020-01-16 DIAGNOSIS — Z95.2 AORTIC VALVE REPLACED: ICD-10-CM

## 2020-01-16 LAB
BH CV ECHO MEAS - % IVS THICK: 70.9 %
BH CV ECHO MEAS - % LVPW THICK: 41.9 %
BH CV ECHO MEAS - ACS: 1.5 CM
BH CV ECHO MEAS - AO MAX PG (FULL): 25.3 MMHG
BH CV ECHO MEAS - AO MAX PG: 29.7 MMHG
BH CV ECHO MEAS - AO MEAN PG (FULL): 13.8 MMHG
BH CV ECHO MEAS - AO MEAN PG: 16.3 MMHG
BH CV ECHO MEAS - AO ROOT AREA (BSA CORRECTED): 1.1
BH CV ECHO MEAS - AO ROOT AREA: 3.7 CM^2
BH CV ECHO MEAS - AO ROOT DIAM: 2.2 CM
BH CV ECHO MEAS - AO V2 MAX: 269.7 CM/SEC
BH CV ECHO MEAS - AO V2 MEAN: 185.5 CM/SEC
BH CV ECHO MEAS - AO V2 VTI: 52 CM
BH CV ECHO MEAS - AVA(I,A): 1.6 CM^2
BH CV ECHO MEAS - AVA(I,D): 1.6 CM^2
BH CV ECHO MEAS - AVA(V,A): 1.4 CM^2
BH CV ECHO MEAS - AVA(V,D): 1.4 CM^2
BH CV ECHO MEAS - BSA(HAYCOCK): 2 M^2
BH CV ECHO MEAS - BSA: 2 M^2
BH CV ECHO MEAS - BZI_BMI: 24.7 KILOGRAMS/M^2
BH CV ECHO MEAS - BZI_METRIC_HEIGHT: 180.3 CM
BH CV ECHO MEAS - BZI_METRIC_WEIGHT: 80.3 KG
BH CV ECHO MEAS - EDV(CUBED): 162.2 ML
BH CV ECHO MEAS - EDV(MOD-SP2): 59.2 ML
BH CV ECHO MEAS - EDV(MOD-SP4): 58.5 ML
BH CV ECHO MEAS - EDV(TEICH): 144.6 ML
BH CV ECHO MEAS - EF(CUBED): 70.3 %
BH CV ECHO MEAS - EF(MOD-BP): 56 %
BH CV ECHO MEAS - EF(MOD-SP2): 58.8 %
BH CV ECHO MEAS - EF(MOD-SP4): 54.3 %
BH CV ECHO MEAS - EF(TEICH): 61.4 %
BH CV ECHO MEAS - ESV(CUBED): 48.2 ML
BH CV ECHO MEAS - ESV(MOD-SP2): 24.4 ML
BH CV ECHO MEAS - ESV(MOD-SP4): 26.7 ML
BH CV ECHO MEAS - ESV(TEICH): 55.8 ML
BH CV ECHO MEAS - FS: 33.3 %
BH CV ECHO MEAS - IVS/LVPW: 0.91
BH CV ECHO MEAS - IVSD: 0.94 CM
BH CV ECHO MEAS - IVSS: 1.6 CM
BH CV ECHO MEAS - LA DIMENSION(2D): 3.7 CM
BH CV ECHO MEAS - LV DIASTOLIC VOL/BSA (35-75): 29.2 ML/M^2
BH CV ECHO MEAS - LV MASS(C)D: 205 GRAMS
BH CV ECHO MEAS - LV MASS(C)DI: 102.4 GRAMS/M^2
BH CV ECHO MEAS - LV MASS(C)S: 210.2 GRAMS
BH CV ECHO MEAS - LV MASS(C)SI: 105 GRAMS/M^2
BH CV ECHO MEAS - LV MAX PG: 4.4 MMHG
BH CV ECHO MEAS - LV MEAN PG: 2.5 MMHG
BH CV ECHO MEAS - LV SYSTOLIC VOL/BSA (12-30): 13.3 ML/M^2
BH CV ECHO MEAS - LV V1 MAX: 104.9 CM/SEC
BH CV ECHO MEAS - LV V1 MEAN: 75 CM/SEC
BH CV ECHO MEAS - LV V1 VTI: 23.1 CM
BH CV ECHO MEAS - LVIDD: 5.5 CM
BH CV ECHO MEAS - LVIDS: 3.6 CM
BH CV ECHO MEAS - LVOT AREA: 3.6 CM^2
BH CV ECHO MEAS - LVOT DIAM: 2.1 CM
BH CV ECHO MEAS - LVPWD: 1 CM
BH CV ECHO MEAS - LVPWS: 1.5 CM
BH CV ECHO MEAS - MV A MAX VEL: 92 CM/SEC
BH CV ECHO MEAS - MV DEC SLOPE: 423.7 CM/SEC^2
BH CV ECHO MEAS - MV DEC TIME: 0.16 SEC
BH CV ECHO MEAS - MV E MAX VEL: 67 CM/SEC
BH CV ECHO MEAS - MV E/A: 0.73
BH CV ECHO MEAS - MV MAX PG: 5.2 MMHG
BH CV ECHO MEAS - MV MEAN PG: 2.1 MMHG
BH CV ECHO MEAS - MV V2 MAX: 114.4 CM/SEC
BH CV ECHO MEAS - MV V2 MEAN: 67.2 CM/SEC
BH CV ECHO MEAS - MV V2 VTI: 22.8 CM
BH CV ECHO MEAS - MVA(VTI): 3.7 CM^2
BH CV ECHO MEAS - PA ACC TIME: 0.11 SEC
BH CV ECHO MEAS - PA MAX PG (FULL): 3.1 MMHG
BH CV ECHO MEAS - PA MAX PG: 6.6 MMHG
BH CV ECHO MEAS - PA MEAN PG (FULL): 2.2 MMHG
BH CV ECHO MEAS - PA MEAN PG: 4.2 MMHG
BH CV ECHO MEAS - PA PR(ACCEL): 30.7 MMHG
BH CV ECHO MEAS - PA V2 MAX: 128 CM/SEC
BH CV ECHO MEAS - PA V2 MEAN: 99.3 CM/SEC
BH CV ECHO MEAS - PA V2 VTI: 23.4 CM
BH CV ECHO MEAS - RAP SYSTOLE: 3 MMHG
BH CV ECHO MEAS - RV MAX PG: 3.5 MMHG
BH CV ECHO MEAS - RV MEAN PG: 2 MMHG
BH CV ECHO MEAS - RV V1 MAX: 93.2 CM/SEC
BH CV ECHO MEAS - RV V1 MEAN: 65.1 CM/SEC
BH CV ECHO MEAS - RV V1 VTI: 17.8 CM
BH CV ECHO MEAS - RVDD: 2.4 CM
BH CV ECHO MEAS - RVSP: 23.5 MMHG
BH CV ECHO MEAS - SI(AO): 97 ML/M^2
BH CV ECHO MEAS - SI(CUBED): 56.9 ML/M^2
BH CV ECHO MEAS - SI(LVOT): 41.6 ML/M^2
BH CV ECHO MEAS - SI(MOD-SP2): 17.4 ML/M^2
BH CV ECHO MEAS - SI(MOD-SP4): 15.9 ML/M^2
BH CV ECHO MEAS - SI(TEICH): 44.3 ML/M^2
BH CV ECHO MEAS - SV(AO): 194.2 ML
BH CV ECHO MEAS - SV(CUBED): 114 ML
BH CV ECHO MEAS - SV(LVOT): 83.3 ML
BH CV ECHO MEAS - SV(MOD-SP2): 34.8 ML
BH CV ECHO MEAS - SV(MOD-SP4): 31.8 ML
BH CV ECHO MEAS - SV(TEICH): 88.7 ML
BH CV ECHO MEAS - TR MAX VEL: 226.4 CM/SEC
MAXIMAL PREDICTED HEART RATE: 148 BPM
STRESS TARGET HR: 126 BPM

## 2020-01-16 PROCEDURE — 93306 TTE W/DOPPLER COMPLETE: CPT | Performed by: INTERNAL MEDICINE

## 2020-01-16 PROCEDURE — 93306 TTE W/DOPPLER COMPLETE: CPT

## 2020-03-30 ENCOUNTER — OFFICE VISIT (OUTPATIENT)
Dept: NEUROLOGY | Facility: CLINIC | Age: 75
End: 2020-03-30

## 2020-03-30 VITALS
DIASTOLIC BLOOD PRESSURE: 80 MMHG | TEMPERATURE: 98.4 F | SYSTOLIC BLOOD PRESSURE: 138 MMHG | HEART RATE: 71 BPM | BODY MASS INDEX: 24.53 KG/M2 | HEIGHT: 71 IN | WEIGHT: 175.2 LBS

## 2020-03-30 DIAGNOSIS — G47.01 INSOMNIA DUE TO MEDICAL CONDITION: ICD-10-CM

## 2020-03-30 DIAGNOSIS — G47.33 OSA (OBSTRUCTIVE SLEEP APNEA): Primary | ICD-10-CM

## 2020-03-30 PROCEDURE — 99213 OFFICE O/P EST LOW 20 MIN: CPT | Performed by: PSYCHIATRY & NEUROLOGY

## 2020-03-30 NOTE — PROGRESS NOTES
Sleep medicine follow-up visit    Berto Cain   1945  75 y.o. male   DATE OF SERVICE: 3/30/2020     Yearly f/u for cpap compliance, pt. doing well with pap therapy. pt. uses a full face mask and gets supplies through [facility]. Patient states every morning been waking up with extreme dry mouth.      On NPSG at [facility] , 02/04/2008 patient had Moderate obstructive sleep apnea syndrome with apnea-hypopnea index of 16 per sleep hour, minimum SpO2 of 89%    The compliance data reviewed and the patient is on CPAP therapy at 10-14 cm/H2O and compliance data indicates excellent compliance with 96% usage for more than 4 hours with an average usage of 6 hours 30 minutes. AHI down to 1.5 with CPAP therapy and mean CPAP pressure 10.2 cm of water.      The patient's hypersomnia also resolved with [place] Sleepiness Scale score of 3 with CPAP therapy.  The patient feels great and is benefiting from it and is compliant.     Insomnia, unchanged.  trouble with falling asleep but no rls or pain . Unchanged.  Some trouble falling asleep, takes naps in afternoon up to 90 min.      Review of Systems   Constitutional: Negative for appetite change and fatigue.   HENT: Negative for sinus pressure and sinus pain.    Eyes: Negative for pain and itching.   Respiratory: Positive for apnea. Negative for cough and shortness of breath.    Cardiovascular: Negative for chest pain and palpitations.   Gastrointestinal: Positive for constipation. Negative for diarrhea.   Endocrine: Negative for cold intolerance and heat intolerance.   Genitourinary: Positive for frequency. Negative for difficulty urinating.   Musculoskeletal: Negative for back pain and neck pain.   Allergic/Immunologic: Negative for environmental allergies and food allergies.   Neurological: Positive for light-headedness. Negative for dizziness, tremors, seizures, syncope, facial asymmetry, speech difficulty, weakness, numbness and headaches.   Psychiatric/Behavioral: Negative for  agitation and confusion.     I reviewed and addressed ROS entered by MA.        The following portions of the patient's history were reviewed and updated as appropriate: allergies, current medications, past family history, past medical history, past social history, past surgical history and problem list.      Family History   Problem Relation Age of Onset   • Heart failure Mother    • Alzheimer's disease Father    • Heart failure Brother        Past Medical History:   Diagnosis Date   • Cancer (CMS/[facility])     Leukemia   • Chest pain, atypical 1/11/2020   • Diabetes mellitus (CMS/[facility])    • History of cardiac monitoring 06/29/2018    Underlying NSR, Avg HR 91 bpm, 11 SVT runs with the fastest lasting 4 beats at 250 bpm.   • History of echocardiogram 10/24/2018    TTE showed EF 60-65%, Normal RV, Mild LAE, TAVR leaflets appear to move well, mild paul-valvular regurg, mild MR/TR, RVSP 35 mmHg.   • History of echocardiogram 06/19/2019    Mild concentric LVH, Mild MR/TR, TAVR in place with mild paravalvular AI, EF 61%   • Hyperlipidemia    • Hypertension    • ISIDRA (obstructive sleep apnea)    • Pulmonary hypertension (CMS/[facility])        Social History     Socioeconomic History   • Marital status:      Spouse name: Not on file   • Number of children: Not on file   • Years of education: Not on file   • Highest education level: Not on file   Tobacco Use   • Smoking status: Never Smoker   • Smokeless tobacco: Never Used   Substance and Sexual Activity   • Alcohol use: No   • Drug use: No   • Sexual activity: Not Currently     Partners: Female         Current Outpatient Medications:   •  amLODIPine (NORVASC) 5 MG tablet, Take 5 mg by mouth Daily., Disp: , Rfl:   •  aspirin 81 MG EC tablet, Take 81 mg by mouth Daily., Disp: , Rfl:   •  atorvastatin (LIPITOR) 20 MG tablet, Take 20 mg by mouth Daily., Disp: , Rfl:   •  calcium carbonate-cholecalciferol 500-400 MG-UNIT tablet tablet, Take  by mouth Daily., Disp: , Rfl:   •   hydrocortisone (CORTEF) 5 MG tablet, Take 5 mg by mouth Daily., Disp: , Rfl:   •  loperamide (IMODIUM) 2 MG capsule, Take 2 mg by mouth 4 (Four) Times a Day As Needed for Diarrhea., Disp: , Rfl:   •  Mirabegron ER (MYRBETRIQ) 50 MG tablet sustained-release 24 hour 24 hr tablet, Take 50 mg by mouth Daily., Disp: , Rfl:   •  phosphorus (K PHOS NEUTRAL) 155-852-130 MG tablet, Take 1 tablet by mouth 2 (Two) Times a Day., Disp: , Rfl:   •  Turmeric 500 MG capsule, Take 1 capsule by mouth 2 (Two) Times a Day., Disp: , Rfl:   •  vitamin B-12 (CYANOCOBALAMIN) 100 MCG tablet, Take 50 mcg by mouth Daily., Disp: , Rfl:     No Known Allergies     PHYSICAL EXAMINATION:  Vitals:    03/30/20 1102   BP: 138/80   Pulse: 71   Temp: 98.4 °F (36.9 °C)      Body mass index is 24.44 kg/m².       HEENT: Normal.      EXTREMITIES: No edema.     IMPRESSION:     Patient with obstructive sleep apnea syndrome with hypersomnia successfully treated with CPAP therapy and is compliant and benefiting from it.     Insomnia, poor sleep hygiene    RECOMMENDATIONS:   1. Continue present CPAP.   2. Follow up 1 year.   3 discussed taking shorter naps to avoid effect on night time sleep    EPWORTH SLEEPINESS SCALE  Sitting and reading  0  WatchingTV  3  Sitting, inactive, in a public place  0  As a passenger in a car for 1 hour w/o a break  0  Lying down to rest in the afternoon  0  Sitting and talking to someone  0  Sitting quietly after a lunch  0  In a car, while stopped for traffic or a light  0  Total 3        This document has been electronically signed by Joseph Seipel, MD on March 30, 2020 11:23

## 2020-04-03 ENCOUNTER — TELEPHONE (OUTPATIENT)
Dept: NEUROLOGY | Facility: CLINIC | Age: 75
End: 2020-04-03

## 2020-04-03 DIAGNOSIS — G47.33 OBSTRUCTIVE SLEEP APNEA: Primary | ICD-10-CM

## 2020-08-11 ENCOUNTER — TELEPHONE (OUTPATIENT)
Dept: NEUROLOGY | Facility: CLINIC | Age: 75
End: 2020-08-11

## 2020-08-11 DIAGNOSIS — G47.33 OBSTRUCTIVE SLEEP APNEA: Primary | ICD-10-CM

## 2020-08-11 NOTE — TELEPHONE ENCOUNTER
Patient called and stated that starting last night his CPAP machine is making a loud noise that kept him awake so he did not use it. He is wanting to know if it is just old and needs to be replaced or what he can do.      Can someone please contact him and advise how he should proceed?       Berto Cain   [phone]

## 2020-08-19 ENCOUNTER — TELEPHONE (OUTPATIENT)
Dept: NEUROLOGY | Facility: CLINIC | Age: 75
End: 2020-08-19

## 2020-08-19 NOTE — TELEPHONE ENCOUNTER
Spoke with pt he hasn't received replacement cpap that was ordered. I called [employer] and they were awaiting signed paperwork from pt. They were going to call pt.

## 2020-08-19 NOTE — TELEPHONE ENCOUNTER
PT CALLING ABOUT HIS CPAP THAT HE IS HAVING A PROBLEM WITH IT AND WAS GOING TO GET A REPLACEMENT BUT HE CAN'T GET ONE. PT WANTS TO TALK TO DR. SEIPEL ABOUT THE CPAP. PLEASE CALL HIM BACK [phone]

## 2020-09-10 ENCOUNTER — TELEPHONE (OUTPATIENT)
Dept: NEUROLOGY | Facility: CLINIC | Age: 75
End: 2020-09-10

## 2020-09-10 NOTE — TELEPHONE ENCOUNTER
Spoke with patient, he is going to call [facility] to see if they can tell him his time frame to get in office. If it needs to be after 11-7-2020 we can work in patient.

## 2020-09-10 NOTE — TELEPHONE ENCOUNTER
PT CALLED TO [facility] FOLLOW UP FOR SLEEP AFTER  RECEIVING HIS CPAP MACHINE. HE SAID HE RECEIVED IT SOME TIME REALLY SOON AFTER 8/7/20. FIRST AVAILABLE FOR DR. SEIPEL ISN'T UNTIL 11/18/20. I BOOKED HIM FOR THAT APPT BUT THAT WOULD MAKE IT AFTER THE 90 DAY WINDOW THAT HE HAS TO SCHEDULE. PLEASE NOTIFY PT IF OFFICE IS ABLE TO WORK HIM IN SOONER FOR INS PURPOSES.      CALL BACK- [phone]

## 2020-09-11 NOTE — TELEPHONE ENCOUNTER
PT CALLED IN STATING HE FOUND OUT HE DIDN'T RECEIVE HIS MACHINE UNTIL 8/21 SO HE IS GOOD TO KEEP HIS APPT ON 11/18. PLEASE BE ADVISED

## 2020-10-23 ENCOUNTER — OFFICE VISIT (OUTPATIENT)
Dept: CARDIOLOGY | Facility: CLINIC | Age: 75
End: 2020-10-23

## 2020-10-23 VITALS
HEIGHT: 71 IN | SYSTOLIC BLOOD PRESSURE: 110 MMHG | HEART RATE: 78 BPM | OXYGEN SATURATION: 96 % | RESPIRATION RATE: 18 BRPM | WEIGHT: 153.4 LBS | DIASTOLIC BLOOD PRESSURE: 72 MMHG | BODY MASS INDEX: 21.48 KG/M2

## 2020-10-23 DIAGNOSIS — E78.2 MIXED HYPERLIPIDEMIA: ICD-10-CM

## 2020-10-23 DIAGNOSIS — I27.20 PULMONARY HYPERTENSION (HCC): ICD-10-CM

## 2020-10-23 DIAGNOSIS — I10 ESSENTIAL HYPERTENSION: ICD-10-CM

## 2020-10-23 DIAGNOSIS — Z95.2 HISTORY OF AORTIC VALVE REPLACEMENT: ICD-10-CM

## 2020-10-23 DIAGNOSIS — I25.10 CORONARY ARTERY DISEASE DUE TO LIPID RICH PLAQUE: Primary | ICD-10-CM

## 2020-10-23 DIAGNOSIS — G47.33 OSA (OBSTRUCTIVE SLEEP APNEA): ICD-10-CM

## 2020-10-23 DIAGNOSIS — I25.83 CORONARY ARTERY DISEASE DUE TO LIPID RICH PLAQUE: Primary | ICD-10-CM

## 2020-10-23 DIAGNOSIS — I35.0 NONRHEUMATIC AORTIC VALVE STENOSIS: ICD-10-CM

## 2020-10-23 PROCEDURE — 99214 OFFICE O/P EST MOD 30 MIN: CPT | Performed by: INTERNAL MEDICINE

## 2020-10-23 RX ORDER — WARFARIN SODIUM 7.5 MG/1
7.5 TABLET ORAL NIGHTLY
COMMUNITY

## 2020-10-23 NOTE — PROGRESS NOTES
Subjective:     Encounter Date:10/23/2020      Patient ID: Berto Cain is a 75 y.o. male.    Chief Complaint:  Chief Complaint   Patient presents with   • Coronary Artery Disease   • Cardiac Valve Problem     Hx AVR   • Hypertension   • Hyperlipidemia       HPI:  Berto is a 74-year-old male patient of mine.  He previously was diagnosed with critical aortic stenosis and underwent transcatheter aortic valve replacement by me 6/2/2016.  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  His coronary artery risk factors are significant for dyslipidemia diabetes mellitus hypertension.  He also has a history of obstructive sleep apnea.    6/29/2018 he was complaining of dizziness.  He underwent a ZIO Patch 14-day evaluation and was found to have 11 runs of supraventricular tachycardia the worst 4 beats total.  This was determined to be a medically managed arrhythmia.  He underwent echocardiographic evaluation in 10/24/2018 which I personally reviewed: He had an ejection fraction of 60 to 65% with normal RV mild left atrial enlargement with normal bioprosthetic leaflet mobility with a mild paravalvular leak.    Last visit he had mild exertional dyspnea.  His dyspnea occurs only with exertion and resolves with rest.  He has no associated chest pain or diaphoresis.    I personally reviewed his echocardiogram from 6/19/2019 showed mild concentric left ventricular hypertrophy mild MR and TR with mild paravalvular leak around his transcatheter aortic valve.  His ejection fraction was 61%.    Unfortunately suffered a stroke in June.  He is recovered amazingly.  He has no complaints from a cardiovascular standpoint.    The following portions of the patient's history were reviewed and updated as appropriate: allergies, current medications, past family history, past medical history, past social history, past surgical history and problem list.    Problem List:  Patient Active Problem List    Diagnosis   • ISIDRA (obstructive sleep apnea)   • Diabetes mellitus (CMS/HCC)   • Hyperlipidemia   • Hypertension   • Pulmonary hypertension (CMS/HCC)   • Coronary artery disease due to lipid rich plaque   • Aortic stenosis, severe   • Chest pain, atypical   • Thrombocytopenia (CMS/HCC)   • S/P allogeneic bone marrow transplant (CMS/HCC)   • Stem cells transplant status (CMS/[facility])   • Insomnia, unspecified       Past Medical History:  Past Medical History:   Diagnosis Date   • Cancer (CMS/[facility])     Leukemia   • Chest pain, atypical 1/11/2020   • Diabetes mellitus (CMS/HCC)    • History of cardiac monitoring 06/29/2018    Underlying NSR, Avg HR 91 bpm, 11 SVT runs with the fastest lasting 4 beats at 250 bpm.   • History of echocardiogram 10/24/2018    TTE showed EF 60-65%, Normal RV, Mild LAE, TAVR leaflets appear to move well, mild paul-valvular regurg, mild MR/TR, RVSP 35 mmHg.   • History of echocardiogram 06/19/2019    Mild concentric LVH, Mild MR/TR, TAVR in place with mild paravalvular AI, EF 61%   • Hyperlipidemia    • Hypertension    • ISIDRA (obstructive sleep apnea)    • Pulmonary hypertension (CMS/HCC)        Past Surgical History:  Past Surgical History:   Procedure Laterality Date   • CARDIAC VALVE REPLACEMENT  06/02/2016    TAVR-Dr. Maldonado   • LIVER BIOPSY     • TONSILLECTOMY         Social History:  Social History     Socioeconomic History   • Marital status:      Spouse name: Not on file   • Number of children: Not on file   • Years of education: Not on file   • Highest education level: Not on file   Tobacco Use   • Smoking status: Never Smoker   • Smokeless tobacco: Never Used   Substance and Sexual Activity   • Alcohol use: No   • Drug use: No   • Sexual activity: Not Currently     Partners: Female       Allergies:  No Known Allergies      Review of Symptoms:  Constitutional: Patient afebrile no chills or unexpected weight changes  Respiratory: No cough, no wheezing or dyspnea  Cardiovascular:  "Today the patient complains of no chest pain, palpitations, dyspnea, orthopnea and no edema  Gastrointestinal: No nausea, vomiting, constipation or diarrhea.  No melena or dark stools    All other systems reviewed and are negative             Objective:         /72 (BP Location: Left arm, Patient Position: Sitting, Cuff Size: Large Adult)   Pulse 78   Resp 18   Ht 180.3 cm (71\")   Wt 69.6 kg (153 lb 6.4 oz)   SpO2 96%   BMI 21.39 kg/m²     Physical exam  Constitutional: well-nourished, and appears stated age in no acute distress  PERRL: Conjunctiva clear, no pallor, anicteric  HENMT: normocephalic, normal dentition, no cyanosis or pallor  Neck:no bruits, or thrills and bilateral normal carotid upstroke. Normal jugular venous pressure  Cardiovascular: No parasternal heaves an non-displaced focal PMI. Normal rate and rhythm: no rub, gallop, murmur or click and normal S1 and S2; no lower or upper extremity edema.   Lungs: unlabored, no wheezing with no rales or rhonchi on auscultation.  Extremities: Warm, no clubbing, cyanosis. Full and equal peripheral pulses in extremities with no bruits appreciated.   Abdomen: soft, non-tender, non-distended  Musculoskeletal: no joint tenderness or swelling and no erythema  Skin: Warm and dry, non-erythematous   Neuro:alert and normal affect. Oriented to time, place and person.           In-Office Procedure(s):  Procedures    ASCVD RIsk Score::  The ASCVD Risk score (Oren DC Jr., et al., 2013) failed to calculate for the following reasons:    Cannot find a previous HDL lab    Cannot find a previous total cholesterol lab    Recent Radiology:  Imaging Results (Most Recent)     None          Lab Review:   No visits with results within 2 Month(s) from this visit.   Latest known visit with results is:   Hospital Outpatient Visit on 01/16/2020   Component Date Value   • BSA 01/16/2020 2.0    • RVIDd 01/16/2020 2.4    • IVSd 01/16/2020 0.94    • IVSs 01/16/2020 1.6    • LVIDd " 01/16/2020 5.5    • LVIDs 01/16/2020 3.6    • LVPWd 01/16/2020 1.0    • BH CV ECHO JONNATHAN - LVPWS 01/16/2020 1.5    • IVS/LVPW 01/16/2020 0.91    • FS 01/16/2020 33.3    • EDV(Teich) 01/16/2020 144.6    • ESV(Teich) 01/16/2020 55.8    • EF(Teich) 01/16/2020 61.4    • EDV(cubed) 01/16/2020 162.2    • ESV(cubed) 01/16/2020 48.2    • EF(cubed) 01/16/2020 70.3    • % IVS thick 01/16/2020 70.9    • % LVPW thick 01/16/2020 41.9    • LV mass(C)d 01/16/2020 205.0    • LV mass(C)dI 01/16/2020 102.4    • LV mass(C)s 01/16/2020 210.2    • LV mass(C)sI 01/16/2020 105.0    • SV(Teich) 01/16/2020 88.7    • SI(Teich) 01/16/2020 44.3    • SV(cubed) 01/16/2020 114.0    • SI(cubed) 01/16/2020 56.9    • Ao root diam 01/16/2020 2.2    • Ao root area 01/16/2020 3.7    • ACS 01/16/2020 1.5    • LVOT diam 01/16/2020 2.1    • LVOT area 01/16/2020 3.6    • EDV(MOD-sp4) 01/16/2020 58.5    • ESV(MOD-sp4) 01/16/2020 26.7    • EF(MOD-sp4) 01/16/2020 54.3    • EDV(MOD-sp2) 01/16/2020 59.2    • ESV(MOD-sp2) 01/16/2020 24.4    • EF(MOD-sp2) 01/16/2020 58.8    • SV(MOD-sp4) 01/16/2020 31.8    • SI(MOD-sp4) 01/16/2020 15.9    • SV(MOD-sp2) 01/16/2020 34.8    • SI(MOD-sp2) 01/16/2020 17.4    • Ao root area (BSA correc* 01/16/2020 1.1    • LV Cornell Vol (BSA correct* 01/16/2020 29.2    • LV Sys Vol (BSA correcte* 01/16/2020 13.3    • MV E max surendra 01/16/2020 67.0    • MV A max surendra 01/16/2020 92.0    • MV E/A 01/16/2020 0.73    • MV V2 max 01/16/2020 114.4    • MV max PG 01/16/2020 5.2    • MV V2 mean 01/16/2020 67.2    • MV mean PG 01/16/2020 2.1    • MV V2 VTI 01/16/2020 22.8    • MVA(VTI) 01/16/2020 3.7    • MV dec slope 01/16/2020 423.7    • MV dec time 01/16/2020 0.16    • Ao pk surendra 01/16/2020 269.7    • Ao max PG 01/16/2020 29.7    • Ao max PG (full) 01/16/2020 25.3    • Ao V2 mean 01/16/2020 185.5    • Ao mean PG 01/16/2020 16.3    • Ao mean PG (full) 01/16/2020 13.8    • Ao V2 VTI 01/16/2020 52.0    • HAROLDO(I,A) 01/16/2020 1.6    • HAROLDO(I,D) 01/16/2020  1.6    • HAROLDO(V,A) 01/16/2020 1.4    • HAROLDO(V,D) 01/16/2020 1.4    • LV V1 max PG 01/16/2020 4.4    • LV V1 mean PG 01/16/2020 2.5    • LV V1 max 01/16/2020 104.9    • LV V1 mean 01/16/2020 75.0    • LV V1 VTI 01/16/2020 23.1    • SV(Ao) 01/16/2020 194.2    • SI(Ao) 01/16/2020 97.0    • SV(LVOT) 01/16/2020 83.3    • SI(LVOT) 01/16/2020 41.6    • PA V2 max 01/16/2020 128.0    • PA max PG 01/16/2020 6.6    • PA max PG (full) 01/16/2020 3.1    • PA V2 mean 01/16/2020 99.3    • PA mean PG 01/16/2020 4.2    • PA mean PG (full) 01/16/2020 2.2    • PA V2 VTI 01/16/2020 23.4    • PA acc time 01/16/2020 0.11    • RV V1 max PG 01/16/2020 3.5    • RV V1 mean PG 01/16/2020 2.0    • RV V1 max 01/16/2020 93.2    • RV V1 mean 01/16/2020 65.1    • RV V1 VTI 01/16/2020 17.8    • TR max surendra 01/16/2020 226.4    • RVSP(TR) 01/16/2020 23.5    • RAP systole 01/16/2020 3.0    • PA pr(Accel) 01/16/2020 30.7    • BH CV ECHO JONNATHAN - BZI_BMI 01/16/2020 24.7    •  CV ECHO JONNATHAN - BSA(HA* 01/16/2020 2.0    •  CV ECHO JONNATHAN - BZI_ME* 01/16/2020 80.3    •  CV ECHO JONNATHAN - BZI_ME* 01/16/2020 180.3    • Target HR (85%) 01/16/2020 126    • Max. Pred. HR (100%) 01/16/2020 148    • EF(MOD-bp) 01/16/2020 56.0    • LA dimension(2D) 01/16/2020 3.7               Invalid input(s): ALKPO4                        Invalid input(s): LDLCALC                Assessment:          Diagnosis Plan   1. Coronary artery disease due to lipid rich plaque     2. Mixed hyperlipidemia     3. Pulmonary hypertension (CMS/HCC)     4. Essential hypertension     5. ISIDRA (obstructive sleep apnea)            Plan:         1. Coronary artery disease due to lipid rich plaque  Clinically silent.  Stable asymptomatic.    We will repeat echo in the future    2. Mixed hyperlipidemia  Well-controlled on medical therapy    3. Pulmonary hypertension (CMS/HCC)  Stable    4. Essential hypertension  Well-controlled on medical therapy    5. ISIDRA (obstructive sleep apnea)  CPAP                  Karson Maldonado MD  10/23/20  .

## 2020-11-06 ENCOUNTER — HOSPITAL ENCOUNTER (OUTPATIENT)
Dept: CARDIOLOGY | Facility: HOSPITAL | Age: 75
Discharge: HOME | End: 2020-11-06
Admitting: INTERNAL MEDICINE

## 2020-11-06 DIAGNOSIS — Z95.2 HISTORY OF AORTIC VALVE REPLACEMENT: ICD-10-CM

## 2020-11-06 DIAGNOSIS — I35.0 NONRHEUMATIC AORTIC VALVE STENOSIS: ICD-10-CM

## 2020-11-06 PROCEDURE — 93306 TTE W/DOPPLER COMPLETE: CPT

## 2020-11-06 PROCEDURE — 93306 TTE W/DOPPLER COMPLETE: CPT | Performed by: INTERNAL MEDICINE

## 2020-11-07 LAB
BH CV ECHO MEAS - ACS: 1.7 CM
BH CV ECHO MEAS - AO MAX PG (FULL): 25.8 MMHG
BH CV ECHO MEAS - AO MAX PG: 29.5 MMHG
BH CV ECHO MEAS - AO MEAN PG (FULL): 13.6 MMHG
BH CV ECHO MEAS - AO MEAN PG: 15.5 MMHG
BH CV ECHO MEAS - AO ROOT AREA (BSA CORRECTED): 1.6
BH CV ECHO MEAS - AO ROOT AREA: 6.9 CM^2
BH CV ECHO MEAS - AO ROOT DIAM: 3 CM
BH CV ECHO MEAS - AO V2 MAX: 271 CM/SEC
BH CV ECHO MEAS - AO V2 MEAN: 184 CM/SEC
BH CV ECHO MEAS - AO V2 VTI: 67.8 CM
BH CV ECHO MEAS - AVA(I,A): 1.3 CM^2
BH CV ECHO MEAS - AVA(I,D): 1.3 CM^2
BH CV ECHO MEAS - AVA(V,A): 1.3 CM^2
BH CV ECHO MEAS - AVA(V,D): 1.3 CM^2
BH CV ECHO MEAS - BSA(HAYCOCK): 1.9 M^2
BH CV ECHO MEAS - BSA: 1.9 M^2
BH CV ECHO MEAS - BZI_BMI: 21.3 KILOGRAMS/M^2
BH CV ECHO MEAS - BZI_METRIC_HEIGHT: 180.3 CM
BH CV ECHO MEAS - BZI_METRIC_WEIGHT: 69.4 KG
BH CV ECHO MEAS - EDV(CUBED): 157.8 ML
BH CV ECHO MEAS - EDV(MOD-SP4): 83.6 ML
BH CV ECHO MEAS - EDV(TEICH): 141.6 ML
BH CV ECHO MEAS - EF(CUBED): 78.6 %
BH CV ECHO MEAS - EF(MOD-BP): 72 %
BH CV ECHO MEAS - EF(MOD-SP4): 71.8 %
BH CV ECHO MEAS - EF(TEICH): 70.3 %
BH CV ECHO MEAS - ESV(CUBED): 33.8 ML
BH CV ECHO MEAS - ESV(MOD-SP4): 23.5 ML
BH CV ECHO MEAS - ESV(TEICH): 42 ML
BH CV ECHO MEAS - FS: 40.2 %
BH CV ECHO MEAS - IVS/LVPW: 0.91
BH CV ECHO MEAS - IVSD: 0.87 CM
BH CV ECHO MEAS - LA DIMENSION(2D): 4.2 CM
BH CV ECHO MEAS - LV DIASTOLIC VOL/BSA (35-75): 44.4 ML/M^2
BH CV ECHO MEAS - LV MASS(C)D: 183.7 GRAMS
BH CV ECHO MEAS - LV MASS(C)DI: 97.6 GRAMS/M^2
BH CV ECHO MEAS - LV MAX PG: 3.6 MMHG
BH CV ECHO MEAS - LV MEAN PG: 2 MMHG
BH CV ECHO MEAS - LV SYSTOLIC VOL/BSA (12-30): 12.5 ML/M^2
BH CV ECHO MEAS - LV V1 MAX: 95.4 CM/SEC
BH CV ECHO MEAS - LV V1 MEAN: 65.1 CM/SEC
BH CV ECHO MEAS - LV V1 VTI: 24.6 CM
BH CV ECHO MEAS - LVIDD: 5.4 CM
BH CV ECHO MEAS - LVIDS: 3.2 CM
BH CV ECHO MEAS - LVOT AREA: 3.7 CM^2
BH CV ECHO MEAS - LVOT DIAM: 2.2 CM
BH CV ECHO MEAS - LVPWD: 0.96 CM
BH CV ECHO MEAS - PA MAX PG (FULL): 1.8 MMHG
BH CV ECHO MEAS - PA MAX PG: 2.9 MMHG
BH CV ECHO MEAS - PA V2 MAX: 85 CM/SEC
BH CV ECHO MEAS - PI END-D VEL: 127.9 CM/SEC
BH CV ECHO MEAS - PI MAX PG: 14.7 MMHG
BH CV ECHO MEAS - PI MAX VEL: 191.7 CM/SEC
BH CV ECHO MEAS - PULM A REVS DUR: 0.09 SEC
BH CV ECHO MEAS - PULM A REVS VEL: 27.4 CM/SEC
BH CV ECHO MEAS - PULM DIAS VEL: 47.3 CM/SEC
BH CV ECHO MEAS - PULM S/D: 1.6
BH CV ECHO MEAS - PULM SYS VEL: 75.4 CM/SEC
BH CV ECHO MEAS - PVA(V,A): 1.1 CM^2
BH CV ECHO MEAS - PVA(V,D): 1.1 CM^2
BH CV ECHO MEAS - QP/QS: 0.25
BH CV ECHO MEAS - RAP SYSTOLE: 3 MMHG
BH CV ECHO MEAS - RV MAX PG: 1.1 MMHG
BH CV ECHO MEAS - RV MEAN PG: 0.65 MMHG
BH CV ECHO MEAS - RV V1 MAX: 52.7 CM/SEC
BH CV ECHO MEAS - RV V1 MEAN: 38.9 CM/SEC
BH CV ECHO MEAS - RV V1 VTI: 12.9 CM
BH CV ECHO MEAS - RVOT AREA: 1.8 CM^2
BH CV ECHO MEAS - RVOT DIAM: 1.5 CM
BH CV ECHO MEAS - RVSP: 22.4 MMHG
BH CV ECHO MEAS - SI(AO): 247.5 ML/M^2
BH CV ECHO MEAS - SI(CUBED): 65.9 ML/M^2
BH CV ECHO MEAS - SI(LVOT): 48.5 ML/M^2
BH CV ECHO MEAS - SI(MOD-SP4): 31.9 ML/M^2
BH CV ECHO MEAS - SI(TEICH): 52.9 ML/M^2
BH CV ECHO MEAS - SV(AO): 465.7 ML
BH CV ECHO MEAS - SV(CUBED): 124 ML
BH CV ECHO MEAS - SV(LVOT): 91.3 ML
BH CV ECHO MEAS - SV(MOD-SP4): 60 ML
BH CV ECHO MEAS - SV(RVOT): 22.7 ML
BH CV ECHO MEAS - SV(TEICH): 99.6 ML
BH CV ECHO MEAS - TR MAX VEL: 220.1 CM/SEC

## 2020-11-18 ENCOUNTER — OFFICE VISIT (OUTPATIENT)
Dept: NEUROLOGY | Facility: CLINIC | Age: 75
End: 2020-11-18

## 2020-11-18 VITALS
BODY MASS INDEX: 21.06 KG/M2 | HEIGHT: 71 IN | WEIGHT: 150.4 LBS | SYSTOLIC BLOOD PRESSURE: 159 MMHG | HEART RATE: 76 BPM | TEMPERATURE: 97.3 F | DIASTOLIC BLOOD PRESSURE: 80 MMHG

## 2020-11-18 DIAGNOSIS — G47.01 INSOMNIA DUE TO MEDICAL CONDITION: ICD-10-CM

## 2020-11-18 DIAGNOSIS — G47.33 OSA (OBSTRUCTIVE SLEEP APNEA): Primary | ICD-10-CM

## 2020-11-18 PROBLEM — I47.1 SVT (SUPRAVENTRICULAR TACHYCARDIA) (HCC): Status: ACTIVE | Noted: 2020-05-20

## 2020-11-18 PROBLEM — E87.1 HYPONATREMIA: Status: ACTIVE | Noted: 2020-05-20

## 2020-11-18 PROBLEM — E87.29 RESPIRATORY ACIDOSIS: Status: ACTIVE | Noted: 2020-05-20

## 2020-11-18 PROBLEM — I47.10 SVT (SUPRAVENTRICULAR TACHYCARDIA): Status: ACTIVE | Noted: 2020-05-20

## 2020-11-18 PROBLEM — R51.9 HEADACHE: Status: ACTIVE | Noted: 2017-03-01

## 2020-11-18 PROBLEM — K21.9 GASTROESOPHAGEAL REFLUX DISEASE: Status: ACTIVE | Noted: 2020-11-18

## 2020-11-18 PROBLEM — M87.9 OSTEONECROSIS (HCC): Status: ACTIVE | Noted: 2017-02-08

## 2020-11-18 PROBLEM — G44.229 CHRONIC TENSION-TYPE HEADACHE, NOT INTRACTABLE: Status: ACTIVE | Noted: 2017-04-03

## 2020-11-18 PROBLEM — I46.9 CARDIAC ARREST (HCC): Status: ACTIVE | Noted: 2020-05-20

## 2020-11-18 PROBLEM — J96.01 ACUTE RESPIRATORY FAILURE WITH HYPOXEMIA (HCC): Status: ACTIVE | Noted: 2020-05-19

## 2020-11-18 PROBLEM — Z20.822 SUSPECTED COVID-19 VIRUS INFECTION: Status: ACTIVE | Noted: 2020-05-20

## 2020-11-18 PROBLEM — R91.8 BILATERAL PULMONARY INFILTRATES ON CHEST X-RAY: Status: ACTIVE | Noted: 2020-05-20

## 2020-11-18 PROBLEM — R74.01 TRANSAMINITIS: Status: ACTIVE | Noted: 2020-05-20

## 2020-11-18 PROCEDURE — 99213 OFFICE O/P EST LOW 20 MIN: CPT | Performed by: PSYCHIATRY & NEUROLOGY

## 2020-11-18 RX ORDER — MULTIPLE VITAMINS W/ MINERALS TAB 9MG-400MCG
1 TAB ORAL DAILY
COMMUNITY

## 2020-11-18 RX ORDER — CHLORAL HYDRATE 500 MG
2000 CAPSULE ORAL
COMMUNITY

## 2020-11-18 NOTE — PROGRESS NOTES
Sleep medicine follow-up visit    Berto Rodarteshakira   1945  75 y.o. male   DATE OF SERVICE: 11/18/2020     ISIDRA, 3 month f/u with new CPAP machine, pt. doing well with pap therapy. pt. uses a full face mask and gets supplies through [facility].  Since last seen patient has had 4 strokes 2 on each side finished rehab still having balance issues walks with a walker and has 2 fingers on his left hand that stay numb and cold.     SLEEP TESTING HISTORY:    On NPSG at [facility] , 02/04/2008 patient had Moderate obstructive sleep apnea syndrome with apnea-hypopnea index of 16 per sleep hour, minimum SpO2 of 89%    The compliance data reviewed and the patient is on CPAP therapy at 10-14 cm/H2O and compliance data indicates excellent compliance with 96% usage for more than 4 hours with an average usage of 7 hours 15 minutes. AHI down to 1.9 with CPAP therapy and mean CPAP pressure 10.1 cm of water.      The patient's hypersomnia also resolved with [place] Sleepiness Scale score of 9 with CPAP therapy.  The patient feels great and is benefiting from it and is compliant.     Insomnia, due to poor sleep hygiene, no medication needed    Review of Systems   Constitutional: Positive for fatigue. Negative for appetite change.   HENT: Negative for sinus pressure and sinus pain.    Eyes: Negative for pain and itching.   Respiratory: Positive for apnea. Negative for cough and shortness of breath.    Cardiovascular: Negative for chest pain and palpitations.   Gastrointestinal: Positive for constipation. Negative for diarrhea.   Endocrine: Positive for cold intolerance. Negative for heat intolerance.   Genitourinary: Positive for frequency. Negative for difficulty urinating.   Musculoskeletal: Negative for back pain and neck pain.   Allergic/Immunologic: Negative for environmental allergies and food allergies.   Neurological: Positive for light-headedness and numbness. Negative for dizziness, tremors, seizures, syncope, facial asymmetry, speech  difficulty, weakness and headaches.   Psychiatric/Behavioral: Positive for sleep disturbance. Negative for agitation and confusion.     I reviewed and addressed ROS entered by MA.        The following portions of the patient's history were reviewed and updated as appropriate: allergies, current medications, past family history, past medical history, past social history, past surgical history and problem list.      Family History   Problem Relation Age of Onset   • Heart failure Mother    • Alzheimer's disease Father    • Heart failure Brother        Past Medical History:   Diagnosis Date   • Cancer (CMS/[facility])     Leukemia   • Chest pain, atypical 1/11/2020   • Diabetes mellitus (CMS/[facility])    • History of cardiac monitoring 06/29/2018    Underlying NSR, Avg HR 91 bpm, 11 SVT runs with the fastest lasting 4 beats at 250 bpm.   • History of echocardiogram 10/24/2018    TTE showed EF 60-65%, Normal RV, Mild LAE, TAVR leaflets appear to move well, mild paul-valvular regurg, mild MR/TR, RVSP 35 mmHg.   • History of echocardiogram 06/19/2019    Mild concentric LVH, Mild MR/TR, TAVR in place with mild paravalvular AI, EF 61%   • Hyperlipidemia    • Hypertension    • ISIDRA (obstructive sleep apnea)    • Pulmonary hypertension (CMS/[facility])    • Stroke (CMS/[facility])        Social History     Socioeconomic History   • Marital status:      Spouse name: Not on file   • Number of children: Not on file   • Years of education: Not on file   • Highest education level: Not on file   Tobacco Use   • Smoking status: Never Smoker   • Smokeless tobacco: Never Used   Substance and Sexual Activity   • Alcohol use: No   • Drug use: No   • Sexual activity: Not Currently     Partners: Female         Current Outpatient Medications:   •  amLODIPine (NORVASC) 5 MG tablet, Take 5 mg by mouth Daily., Disp: , Rfl:   •  aspirin 81 MG EC tablet, Take 81 mg by mouth Daily., Disp: , Rfl:   •  atorvastatin (LIPITOR) 20 MG tablet, Take 20 mg by mouth Daily.,  Disp: , Rfl:   •  calcium carbonate-cholecalciferol 500-400 MG-UNIT tablet tablet, Take  by mouth Daily., Disp: , Rfl:   •  hydrocortisone (CORTEF) 5 MG tablet, Take 5 mg by mouth Daily., Disp: , Rfl:   •  loperamide (IMODIUM) 2 MG capsule, Take 2 mg by mouth 4 (Four) Times a Day As Needed for Diarrhea., Disp: , Rfl:   •  Mirabegron ER (MYRBETRIQ) 50 MG tablet sustained-release 24 hour 24 hr tablet, Take 50 mg by mouth Daily., Disp: , Rfl:   •  multivitamin with minerals (Vision Vitamins) tablet tablet, Take 1 tablet by mouth Daily., Disp: , Rfl:   •  Omega-3 Fatty Acids (fish oil) 1000 MG capsule capsule, Take  by mouth Daily With Breakfast., Disp: , Rfl:   •  phosphorus (K PHOS NEUTRAL) 155-852-130 MG tablet, Take 1 tablet by mouth 2 (Two) Times a Day., Disp: , Rfl:   •  Turmeric 500 MG capsule, Take 1 capsule by mouth 2 (Two) Times a Day., Disp: , Rfl:   •  vitamin B-12 (CYANOCOBALAMIN) 100 MCG tablet, Take 50 mcg by mouth Daily., Disp: , Rfl:   •  warfarin (COUMADIN) 7.5 MG tablet, Take 7.5 mg by mouth Every Night., Disp: , Rfl:     No Known Allergies     PHYSICAL EXAMINATION:  Vitals:    11/18/20 1120   BP: 159/80   Pulse: 76   Temp: 97.3 °F (36.3 °C)      Body mass index is 20.98 kg/m².       HEENT: Normal.       EXTREMITIES: No edema.     IMPRESSION:     Patient with obstructive sleep apnea syndrome with hypersomnia successfully treated with CPAP therapy and is compliant and benefiting from it.     insomnia    RECOMMENDATIONS:   1. Continue present CPAP.   2. Follow up 1 year.     [place]WORTH SLEEPINESS SCALE  Sitting and reading  1  WatchingTV  2  Sitting, inactive, in a public place  1  As a passenger in a car for 1 hour w/o a break  2  Lying down to rest in the afternoon  1  Sitting and talking to someone  1  Sitting quietly after a lunch  1  In a car, while stopped for traffic or a light  0  Total 9        This document has been electronically signed by Joseph Seipel, MD on November 18, 2020 11:41 EST

## 2020-11-19 ENCOUNTER — TELEPHONE (OUTPATIENT)
Dept: NEUROLOGY | Facility: CLINIC | Age: 75
End: 2020-11-19

## 2020-11-19 DIAGNOSIS — G47.33 OBSTRUCTIVE SLEEP APNEA: Primary | ICD-10-CM

## 2020-11-19 NOTE — TELEPHONE ENCOUNTER
----- Message from Joseph F Seipel, MD sent at 11/18/2020 11:40 AM EST -----   full face mask and gets supplies through [facility]

## 2021-04-13 NOTE — PROGRESS NOTES
"Chief Complaint  Sleep Apnea    Subjective          Berto Cain presents to [facility] NEUROLOGY  History of Present Illness     ISIDRA, 3 month f/u with new CPAP machine, pt. doing well with pap therapy. pt. uses a full face mask and gets supplies through [facility]    Sleep testing history:    On NPSG at [facility] , 02/04/2008 patient had Moderate obstructive sleep apnea syndrome with apnea-hypopnea index of 16 per sleep hour, minimum SpO2 of 89%  PAP download:  The patient is on CPAP therapy at 10-14 cm/H2O.   Data indicates Excellent compliance. With 100% usage for more than 4 hours with an average usage of 7 hours 29 minutes. AHI down to 2.1 .  Average pressures 10.2.  Average large leak 8min .     The patient's hypersomnia has resolved       [place] Sleepiness Scale:  Sitting and reading 0 WatchingTV 0  Sitting, inactive, in a public place 0  As a passenger in a car for 1 hour w/o a break  0  Lying down to rest in the afternoon  1  Sitting and talking to someone  0  Sitting quietly after a lunch  1  In a car, while stopped for traffic or a light  0  Total 2    Review of Systems   Constitutional: Negative for fatigue and fever.   HENT: Negative for ear discharge and ear pain.    Eyes: Negative for pain and itching.   Respiratory: Negative for cough and shortness of breath.    Gastrointestinal: Negative for abdominal pain and nausea.   Endocrine: Negative for cold intolerance and heat intolerance.   Musculoskeletal: Positive for neck pain and neck stiffness. Negative for back pain.   Neurological: Positive for headaches. Negative for dizziness.   Psychiatric/Behavioral: Positive for confusion. Negative for agitation.     Objective   Vital Signs:   /71 (BP Location: Right arm, Patient Position: Sitting, Cuff Size: Adult)   Pulse 67   Temp 97.3 °F (36.3 °C)   Ht 180.3 cm (71\")   Wt 78 kg (172 lb)   BMI 23.99 kg/m²     Physical Exam  Constitutional:       Appearance: Normal appearance. He is " normal weight.   Neurological:      Mental Status: He is alert and oriented to person, place, and time.   Psychiatric:         Mood and Affect: Mood normal.        Result Review :                 Assessment and Plan    Diagnoses and all orders for this visit:    1. ISIDRA (obstructive sleep apnea) (Primary)    2. Chronic tension-type headache, not intractable        The patient is compliant with and benefiting from PAP therapy.      Follow Up   Return in about 1 year (around 4/15/2022).  Patient was given instructions and counseling regarding his condition or for health maintenance advice. Please see specific information pulled into the AVS if appropriate.

## 2021-04-15 ENCOUNTER — OFFICE VISIT (OUTPATIENT)
Dept: NEUROLOGY | Facility: CLINIC | Age: 76
End: 2021-04-15

## 2021-04-15 VITALS
SYSTOLIC BLOOD PRESSURE: 131 MMHG | HEIGHT: 71 IN | WEIGHT: 172 LBS | TEMPERATURE: 97.3 F | BODY MASS INDEX: 24.08 KG/M2 | DIASTOLIC BLOOD PRESSURE: 71 MMHG | HEART RATE: 67 BPM

## 2021-04-15 DIAGNOSIS — G47.33 OSA (OBSTRUCTIVE SLEEP APNEA): Primary | Chronic | ICD-10-CM

## 2021-04-15 DIAGNOSIS — G44.229 CHRONIC TENSION-TYPE HEADACHE, NOT INTRACTABLE: Chronic | ICD-10-CM

## 2021-04-15 PROBLEM — R51.9 HEADACHE: Chronic | Status: ACTIVE | Noted: 2017-03-01

## 2021-04-15 PROCEDURE — 99212 OFFICE O/P EST SF 10 MIN: CPT | Performed by: PSYCHIATRY & NEUROLOGY

## 2021-04-15 RX ORDER — TAMSULOSIN HYDROCHLORIDE 0.4 MG/1
1 CAPSULE ORAL DAILY
COMMUNITY
Start: 2021-01-21

## 2021-04-15 RX ORDER — AMLODIPINE BESYLATE AND BENAZEPRIL HYDROCHLORIDE 5; 20 MG/1; MG/1
1 CAPSULE ORAL DAILY
COMMUNITY
End: 2021-04-23

## 2021-04-15 RX ORDER — HYDROCORTISONE 20 MG/1
TABLET ORAL
COMMUNITY
Start: 2021-03-25 | End: 2021-04-23

## 2021-04-15 RX ORDER — ATORVASTATIN CALCIUM 40 MG/1
40 TABLET, FILM COATED ORAL NIGHTLY
COMMUNITY
Start: 2021-02-18

## 2021-04-15 RX ORDER — NITROFURANTOIN 25; 75 MG/1; MG/1
100 CAPSULE ORAL 2 TIMES DAILY
COMMUNITY
Start: 2021-02-22 | End: 2023-02-10

## 2021-04-15 RX ORDER — PANTOPRAZOLE SODIUM 40 MG/1
40 TABLET, DELAYED RELEASE ORAL DAILY
COMMUNITY
Start: 2021-03-24

## 2021-04-22 ENCOUNTER — TELEPHONE (OUTPATIENT)
Dept: NEUROLOGY | Facility: CLINIC | Age: 76
End: 2021-04-22

## 2021-04-22 DIAGNOSIS — G47.33 OBSTRUCTIVE SLEEP APNEA: Primary | ICD-10-CM

## 2021-04-22 NOTE — TELEPHONE ENCOUNTER
----- Message from Joseph F Seipel, MD sent at 4/15/2021  2:29 PM EDT -----   full face mask and gets supplies through [facility]    Pended order for supplies

## 2021-04-23 ENCOUNTER — OFFICE VISIT (OUTPATIENT)
Dept: CARDIOLOGY | Facility: CLINIC | Age: 76
End: 2021-04-23

## 2021-04-23 VITALS
OXYGEN SATURATION: 98 % | WEIGHT: 176.4 LBS | SYSTOLIC BLOOD PRESSURE: 110 MMHG | RESPIRATION RATE: 18 BRPM | HEIGHT: 71 IN | DIASTOLIC BLOOD PRESSURE: 68 MMHG | HEART RATE: 59 BPM | BODY MASS INDEX: 24.69 KG/M2

## 2021-04-23 DIAGNOSIS — I25.10 CORONARY ARTERY DISEASE DUE TO LIPID RICH PLAQUE: Primary | ICD-10-CM

## 2021-04-23 DIAGNOSIS — E78.2 MIXED HYPERLIPIDEMIA: ICD-10-CM

## 2021-04-23 DIAGNOSIS — I10 ESSENTIAL HYPERTENSION: ICD-10-CM

## 2021-04-23 DIAGNOSIS — I35.0 AORTIC STENOSIS, SEVERE: ICD-10-CM

## 2021-04-23 DIAGNOSIS — I25.83 CORONARY ARTERY DISEASE DUE TO LIPID RICH PLAQUE: Primary | ICD-10-CM

## 2021-04-23 PROCEDURE — 99214 OFFICE O/P EST MOD 30 MIN: CPT | Performed by: INTERNAL MEDICINE

## 2021-04-23 NOTE — PROGRESS NOTES
Subjective:     Encounter Date:04/23/2021      Patient ID: Berto Cain is a 76 y.o. male.    Chief Complaint:  Chief Complaint   Patient presents with   • Coronary Artery Disease   • Hyperlipidemia   • Hypertension   • Shortness of Breath       HPI:  Berto is a 76-year-old male patient of mine.  He previously was diagnosed with critical aortic stenosis and underwent transcatheter aortic valve replacement by me 6/2/2016.  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  His coronary artery risk factors are significant for dyslipidemia diabetes mellitus hypertension.  He also has a history of obstructive sleep apnea.    6/29/2018 he was complaining of dizziness.  He underwent a ZIO Patch 14-day evaluation and was found to have 11 runs of supraventricular tachycardia the worst 4 beats total.  This was determined to be a medically managed arrhythmia.  He underwent echocardiographic evaluation in 10/24/2018 which I personally reviewed: He had an ejection fraction of 60 to 65% with normal RV mild left atrial enlargement with normal bioprosthetic leaflet mobility with a mild paravalvular leak.    I personally reviewed his echocardiogram from 6/19/2019 showed mild concentric left ventricular hypertrophy mild MR and TR with mild paravalvular leak around his transcatheter aortic valve.  His ejection fraction was 61%.    Unfortunately he subsequently suffered a stroke.  He is recovered amazingly.      He returns today with no complaints with cardiovascular standpoint.    The following portions of the patient's history were reviewed and updated as appropriate: allergies, current medications, past family history, past medical history, past social history, past surgical history and problem list.    Problem List:  Patient Active Problem List   Diagnosis   • ISIDRA (obstructive sleep apnea)   • Diabetes mellitus (CMS/formerly [facility])   • Hyperlipidemia   • Hypertension   • Pulmonary hypertension (CMS/formerly [facility])    • Coronary artery disease due to lipid rich plaque   • Aortic stenosis, severe   • Chest pain, atypical   • Thrombocytopenia (CMS/HCC)   • S/P allogeneic bone marrow transplant (CMS/HCC)   • Stem cells transplant status (CMS/HCC)   • Insomnia, unspecified   • Acute kidney injury (CMS/HCC)   • Acute myeloblastic leukemia, not having achieved remission (CMS/HCC)   • Acute respiratory failure with hypoxemia (CMS/HCC)   • Benign prostatic hyperplasia   • Bilateral pulmonary infiltrates on chest x-ray   • Cardiac arrest (CMS/HCC)   • Drug-induced hyperglycemia   • Chronic tension-type headache, not intractable   • Early satiety   • Gastroesophageal reflux disease   • Anemia   • 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   • History of echocardiogram 10/24/2018    TTE showed EF 60-65%, Normal RV, Mild LAE, TAVR leaflets appear to move well, mild paul-valvular regurg, mild MR/TR, RVSP 35 mmHg.   • History of echocardiogram 06/19/2019    Mild concentric LVH, Mild MR/TR, TAVR in place with mild paravalvular AI, EF 61%   • Hyperlipidemia    • Hypertension    • ISIDRA (obstructive sleep apnea)    • Pulmonary hypertension (CMS/HCC)    • Stroke (CMS/HCC)        Past Surgical History:  Past Surgical History:   Procedure Laterality Date   • CARDIAC VALVE REPLACEMENT  06/02/2016    TAVR-Dr. Maldonado   • LIVER BIOPSY     • TONSILLECTOMY         Social  "History:  Social History     Socioeconomic History   • Marital status:      Spouse name: Not on file   • Number of children: Not on file   • Years of education: Not on file   • Highest education level: Not on file   Tobacco Use   • Smoking status: Never Smoker   • Smokeless tobacco: Never Used   Substance and Sexual Activity   • Alcohol use: No   • Drug use: No   • Sexual activity: Not Currently     Partners: Female       Allergies:  No Known Allergies      Review of Symptoms:  Constitutional: Patient afebrile no chills or unexpected weight changes  Respiratory: No cough, no wheezing or dyspnea  Cardiovascular: Today the patient complains of no chest pain, palpitations, dyspnea, orthopnea and no edema  Gastrointestinal: No nausea, vomiting, constipation or diarrhea.  No melena or dark stools    All other systems reviewed and are negative             Objective:         /68 (BP Location: Right arm, Patient Position: Sitting, Cuff Size: Adult)   Pulse 59   Resp 18   Ht 180.3 cm (71\")   Wt 80 kg (176 lb 6.4 oz)   SpO2 98%   BMI 24.60 kg/m²     Physical exam  Constitutional: well-nourished, and appears stated age in no acute distress  PERRL: Conjunctiva clear, no pallor, anicteric  HENMT: normocephalic, normal dentition, no cyanosis or pallor  Neck:no bruits, or thrills and bilateral normal carotid upstroke. Normal jugular venous pressure  Cardiovascular: No parasternal heaves an non-displaced focal PMI. Normal rate and rhythm: no rub, gallop, murmur or click and normal S1 and S2; no lower or upper extremity edema.   Lungs: unlabored, no wheezing with no rales or rhonchi on auscultation.  Extremities: Warm, no clubbing, cyanosis. Full and equal peripheral pulses in extremities with no bruits appreciated.   Abdomen: soft, non-tender, non-distended  Musculoskeletal: no joint tenderness or swelling and no erythema  Skin: Warm and dry, non-erythematous   Neuro:alert and normal affect. Oriented to time, " place and person.           In-Office Procedure(s):  Procedures    ASCVD RIsk Score::  The ASCVD Risk score (Orenhillary HEWITT Jr., et al., 2013) failed to calculate for the following reasons:    Cannot find a previous HDL lab    Cannot find a previous total cholesterol lab    Recent Radiology:  Imaging Results (Most Recent)     None          Lab Review:   No visits with results within 2 Month(s) from this visit.   Latest known visit with results is:   Hospital Outpatient Visit on 11/06/2020   Component Date Value   • BSA 11/06/2020 1.9    • IVSd 11/06/2020 0.87    • LVIDd 11/06/2020 5.4    • LVIDs 11/06/2020 3.2    • LVPWd 11/06/2020 0.96    • IVS/LVPW 11/06/2020 0.91    • FS 11/06/2020 40.2    • EDV(Teich) 11/06/2020 141.6    • ESV(Teich) 11/06/2020 42.0    • EF(Teich) 11/06/2020 70.3    • EDV(cubed) 11/06/2020 157.8    • ESV(cubed) 11/06/2020 33.8    • EF(cubed) 11/06/2020 78.6    • LV mass(C)d 11/06/2020 183.7    • LV mass(C)dI 11/06/2020 97.6    • SV(Teich) 11/06/2020 99.6    • SI(Teich) 11/06/2020 52.9    • SV(cubed) 11/06/2020 124.0    • SI(cubed) 11/06/2020 65.9    • Ao root diam 11/06/2020 3.0    • Ao root area 11/06/2020 6.9    • ACS 11/06/2020 1.7    • LVOT diam 11/06/2020 2.2    • LVOT area 11/06/2020 3.7    • RVOT diam 11/06/2020 1.5    • RVOT area 11/06/2020 1.8    • EDV(MOD-sp4) 11/06/2020 83.6    • ESV(MOD-sp4) 11/06/2020 23.5    • EF(MOD-sp4) 11/06/2020 71.8    • SV(MOD-sp4) 11/06/2020 60.0    • SI(MOD-sp4) 11/06/2020 31.9    • Ao root area (BSA correc* 11/06/2020 1.6    • LV Cornell Vol (BSA correct* 11/06/2020 44.4    • LV Sys Vol (BSA correcte* 11/06/2020 12.5    • Ao pk surendra 11/06/2020 271.0    • Ao max PG 11/06/2020 29.5    • Ao max PG (full) 11/06/2020 25.8    • Ao V2 mean 11/06/2020 184.0    • Ao mean PG 11/06/2020 15.5    • Ao mean PG (full) 11/06/2020 13.6    • Ao V2 VTI 11/06/2020 67.8    • HAROLDO(I,A) 11/06/2020 1.3    • HAROLDO(I,D) 11/06/2020 1.3    • HAROLDO(V,A) 11/06/2020 1.3    • HAROLDO(V,D) 11/06/2020 1.3     • LV V1 max PG 11/06/2020 3.6    • LV V1 mean PG 11/06/2020 2.0    • LV V1 max 11/06/2020 95.4    • LV V1 mean 11/06/2020 65.1    • LV V1 VTI 11/06/2020 24.6    • SV(Ao) 11/06/2020 465.7    • SI(Ao) 11/06/2020 247.5    • SV(LVOT) 11/06/2020 91.3    • SV(RVOT) 11/06/2020 22.7    • SI(LVOT) 11/06/2020 48.5    • PA V2 max 11/06/2020 85.0    • PA max PG 11/06/2020 2.9    • PA max PG (full) 11/06/2020 1.8    •  CV ECHO JONNATHAN - PVA(V,* 11/06/2020 1.1    •  CV ECHO JONNATHAN - PVA(V,* 11/06/2020 1.1    • PI end-d pérez 11/06/2020 127.9    • PI max pérez 11/06/2020 191.7    • PI max PG 11/06/2020 14.7    • RV V1 max PG 11/06/2020 1.1    • RV V1 mean PG 11/06/2020 0.65    • RV V1 max 11/06/2020 52.7    • RV V1 mean 11/06/2020 38.9    • RV V1 VTI 11/06/2020 12.9    • TR max pérze 11/06/2020 220.1    • RVSP(TR) 11/06/2020 22.4    • RAP systole 11/06/2020 3.0    • Pulm Sys Pérez 11/06/2020 75.4    • Pulm Cornell Pérez 11/06/2020 47.3    • Pulm S/D 11/06/2020 1.6    • Qp/Qs 11/06/2020 0.25    • Pulm A Revs Dur 11/06/2020 0.09    • Pulm A Revs Pérez 11/06/2020 27.4    •  CV ECHO JONNATHAN - BZI_BMI 11/06/2020 21.3    •  CV ECHO JONNATHAN - BSA(HA* 11/06/2020 1.9    •  CV ECHO JONNATHAN - BZI_ME* 11/06/2020 69.4    •  CV ECHO JONNATHAN - BZI_ME* 11/06/2020 180.3    • EF(MOD-bp) 11/06/2020 72.0    • LA dimension(2D) 11/06/2020 4.2               Invalid input(s): ALKPO4                        Invalid input(s): LDLCALC                Assessment:          Diagnosis Plan   1. Coronary artery disease due to lipid rich plaque     2. Essential hypertension     3. Mixed hyperlipidemia     4. Aortic stenosis, severe            Plan:         1. Coronary artery disease due to lipid rich plaque  Clinically silent. Continue optimize medical therapy secondary prevention    2. Essential hypertension  Well-controlled on medical therapy    3. Mixed hyperlipidemia  Well-controlled on medical therapy    4. Aortic stenosis, severe  Asymptomatic status post transcatheter aortic  valve replacement.                 Karson Maldonado MD  04/23/21  .

## 2021-06-28 ENCOUNTER — TELEPHONE (OUTPATIENT)
Dept: NEUROLOGY | Facility: CLINIC | Age: 76
End: 2021-06-28

## 2021-07-02 NOTE — TELEPHONE ENCOUNTER
Spoke with Pt he will continue use.He has been in contact with [facility] and is on replacement list.

## 2021-07-02 NOTE — TELEPHONE ENCOUNTER
PAOLA LEES  [phone]    THE PT CALLED IN TODAY BECAUSE HE CONTACTED [facility] AND HE SAID THEY ARE STILL TRYING TO FIGURE OUT THE PROCESS ON WHAT TO DO WITH THE RECALLED MACHINES. HE IS WANTING TO GET RECOMMENDATION FROM DR SEIPEL ON WHAT HE SHOULD DO GOING FORWARD. PLEASE GIVE HIM A CALL BACK TO DISCUSS THIS.

## 2021-11-03 ENCOUNTER — OFFICE VISIT (OUTPATIENT)
Dept: CARDIOLOGY | Facility: CLINIC | Age: 76
End: 2021-11-03

## 2021-11-03 VITALS
HEART RATE: 63 BPM | WEIGHT: 160 LBS | RESPIRATION RATE: 18 BRPM | OXYGEN SATURATION: 97 % | SYSTOLIC BLOOD PRESSURE: 138 MMHG | BODY MASS INDEX: 22.4 KG/M2 | HEIGHT: 71 IN | DIASTOLIC BLOOD PRESSURE: 76 MMHG

## 2021-11-03 DIAGNOSIS — I10 PRIMARY HYPERTENSION: ICD-10-CM

## 2021-11-03 DIAGNOSIS — E78.2 MIXED HYPERLIPIDEMIA: ICD-10-CM

## 2021-11-03 DIAGNOSIS — I35.0 AORTIC STENOSIS, SEVERE: ICD-10-CM

## 2021-11-03 DIAGNOSIS — I25.10 CORONARY ARTERY DISEASE DUE TO LIPID RICH PLAQUE: Primary | ICD-10-CM

## 2021-11-03 DIAGNOSIS — I25.83 CORONARY ARTERY DISEASE DUE TO LIPID RICH PLAQUE: Primary | ICD-10-CM

## 2021-11-03 PROCEDURE — 99214 OFFICE O/P EST MOD 30 MIN: CPT | Performed by: INTERNAL MEDICINE

## 2021-11-03 NOTE — PROGRESS NOTES
Subjective:     Encounter Date:11/03/2021      Patient ID: Berto Cain is a 76 y.o. male.    Chief Complaint:  Chief Complaint   Patient presents with   • Coronary Artery Disease   • Hypertension   • Hyperlipidemia   • Cardiac Valve Problem     TAVR 2016       HPI:  Berto is a 76-year-old male patient of mine.  He previously was diagnosed with critical aortic stenosis and underwent transcatheter aortic valve replacement by me 6/2/2016.  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  His coronary artery risk factors are significant for dyslipidemia diabetes mellitus hypertension.  He also has a history of obstructive sleep apnea.    6/29/2018 he was complaining of dizziness.  He underwent a ZIO Patch 14-day evaluation and was found to have 11 runs of supraventricular tachycardia the worst 4 beats total.  This was determined to be a medically managed arrhythmia.  He underwent echocardiographic evaluation in 10/24/2018 which I personally reviewed: He had an ejection fraction of 60 to 65% with normal RV mild left atrial enlargement with normal bioprosthetic leaflet mobility with a mild paravalvular leak.    I personally reviewed his echocardiogram from 6/19/2019 showed mild concentric left ventricular hypertrophy mild MR and TR with mild paravalvular leak around his transcatheter aortic valve.  His ejection fraction was 61%.    Unfortunately he subsequently suffered a stroke.  He is recovered amazingly.          The following portions of the patient's history were reviewed and updated as appropriate: allergies, current medications, past family history, past medical history, past social history, past surgical history and problem list.    Problem List:  Patient Active Problem List   Diagnosis   • ISIDRA (obstructive sleep apnea)   • Diabetes mellitus (HCC)   • Hyperlipidemia   • Hypertension   • Pulmonary hypertension (HCC)   • Coronary artery disease due to lipid rich plaque   •  Aortic stenosis, severe   • Chest pain, atypical   • Thrombocytopenia (HCC)   • S/P allogeneic bone marrow transplant (HCC)   • Stem cells transplant status (HCC)   • Insomnia, unspecified   • Acute kidney injury (HCC)   • Acute myeloblastic leukemia, not having achieved remission (HCC)   • Acute respiratory failure with hypoxemia (HCC)   • Benign prostatic hyperplasia   • Bilateral pulmonary infiltrates on chest x-ray   • Cardiac arrest (HCC)   • Drug-induced hyperglycemia   • Chronic tension-type headache, not intractable   • Early satiety   • Gastroesophageal reflux disease   • Anemia   • GVHD (graft versus host disease) (HCC)   • Headache   • Hyponatremia   • Intractable nausea and vomiting   • Lymphoma (HCC)   • Orthostatic hypotension   • Osteonecrosis (HCC)   • Respiratory acidosis   • Suspected COVID-19 virus infection   • SVT (supraventricular tachycardia) (HCC)   • Transaminitis       Past Medical History:  Past Medical History:   Diagnosis Date   • Cancer (HCC)     Leukemia   • Chest pain, atypical 1/11/2020   • Diabetes mellitus (HCC)    • History of cardiac monitoring 06/29/2018    Underlying NSR, Avg HR 91 bpm, 11 SVT runs with the fastest lasting 4 beats at 250 bpm.   • History of echocardiogram 10/24/2018    TTE showed EF 60-65%, Normal RV, Mild LAE, TAVR leaflets appear to move well, mild paul-valvular regurg, mild MR/TR, RVSP 35 mmHg.   • History of echocardiogram 06/19/2019    Mild concentric LVH, Mild MR/TR, TAVR in place with mild paravalvular AI, EF 61%   • Hyperlipidemia    • Hypertension    • ISIDRA (obstructive sleep apnea)    • Pulmonary hypertension (HCC)    • Stroke (HCC)        Past Surgical History:  Past Surgical History:   Procedure Laterality Date   • CARDIAC VALVE REPLACEMENT  06/02/2016    TAVR-Dr. Maldonado   • LIVER BIOPSY     • TONSILLECTOMY         Social History:  Social History     Socioeconomic History   • Marital status:    Tobacco Use   • Smoking status: Never Smoker  "  • Smokeless tobacco: Never Used   Substance and Sexual Activity   • Alcohol use: No   • Drug use: No   • Sexual activity: Not Currently     Partners: Female       Allergies:  No Known Allergies        Review of Symptoms:  Constitutional: Patient afebrile no chills or unexpected weight changes  Respiratory: No cough, no wheezing or dyspnea  Cardiovascular: Today the patient complains of no chest pain, palpitations, dyspnea, orthopnea and no edema  Gastrointestinal: No nausea, vomiting, constipation or diarrhea.  No melena or dark stools    All other systems reviewed and are negative           Objective:         /76   Pulse 63   Resp 18   Ht 180.3 cm (71\")   Wt 72.6 kg (160 lb)   SpO2 97%   BMI 22.32 kg/m²       Physical exam  Constitutional: well-nourished, and appears stated age in no acute distress  PERRL: Conjunctiva clear, no pallor, anicteric  HENMT: normocephalic, normal dentition, no cyanosis or pallor  Neck:no bruits, or thrills and bilateral normal carotid upstroke. Normal jugular venous pressure  Cardiovascular: No parasternal heaves an non-displaced focal PMI. Normal rate and rhythm: no rub, gallop, murmur or click and normal S1 and S2; no lower or upper extremity edema.   Lungs: unlabored, no wheezing with no rales or rhonchi on auscultation.  Extremities: Warm, no clubbing, cyanosis. Full and equal peripheral pulses in extremities with no bruits appreciated.   Abdomen: soft, non-tender, non-distended  Musculoskeletal: no joint tenderness or swelling and no erythema  Skin: Warm and dry, non-erythematous   Neuro:alert and normal affect. Oriented to time, place and person.       In-Office Procedure(s):  Procedures    ASCVD RIsk Score::  The ASCVD Risk score ([place] DC Jr., et al., 2013) failed to calculate for the following reasons:    Cannot find a previous HDL lab    Cannot find a previous total cholesterol lab    Recent Radiology:  Imaging Results (Most Recent)     None          Lab Review: "   No visits with results within 2 Month(s) from this visit.   Latest known visit with results is:   Hospital Outpatient Visit on 11/06/2020   Component Date Value   • BSA 11/06/2020 1.9    • IVSd 11/06/2020 0.87    • LVIDd 11/06/2020 5.4    • LVIDs 11/06/2020 3.2    • LVPWd 11/06/2020 0.96    • IVS/LVPW 11/06/2020 0.91    • FS 11/06/2020 40.2    • EDV(Teich) 11/06/2020 141.6    • ESV(Teich) 11/06/2020 42.0    • EF(Teich) 11/06/2020 70.3    • EDV(cubed) 11/06/2020 157.8    • ESV(cubed) 11/06/2020 33.8    • EF(cubed) 11/06/2020 78.6    • LV mass(C)d 11/06/2020 183.7    • LV mass(C)dI 11/06/2020 97.6    • SV(Teich) 11/06/2020 99.6    • SI(Teich) 11/06/2020 52.9    • SV(cubed) 11/06/2020 124.0    • SI(cubed) 11/06/2020 65.9    • Ao root diam 11/06/2020 3.0    • Ao root area 11/06/2020 6.9    • ACS 11/06/2020 1.7    • LVOT diam 11/06/2020 2.2    • LVOT area 11/06/2020 3.7    • RVOT diam 11/06/2020 1.5    • RVOT area 11/06/2020 1.8    • EDV(MOD-sp4) 11/06/2020 83.6    • ESV(MOD-sp4) 11/06/2020 23.5    • EF(MOD-sp4) 11/06/2020 71.8    • SV(MOD-sp4) 11/06/2020 60.0    • SI(MOD-sp4) 11/06/2020 31.9    • Ao root area (BSA correc* 11/06/2020 1.6    • LV Cornell Vol (BSA correct* 11/06/2020 44.4    • LV Sys Vol (BSA correcte* 11/06/2020 12.5    • Ao pk surendra 11/06/2020 271.0    • Ao max PG 11/06/2020 29.5    • Ao max PG (full) 11/06/2020 25.8    • Ao V2 mean 11/06/2020 184.0    • Ao mean PG 11/06/2020 15.5    • Ao mean PG (full) 11/06/2020 13.6    • Ao V2 VTI 11/06/2020 67.8    • HAROLDO(I,A) 11/06/2020 1.3    • HAROLDO(I,D) 11/06/2020 1.3    • HAROLDO(V,A) 11/06/2020 1.3    • HAROLDO(V,D) 11/06/2020 1.3    • LV V1 max PG 11/06/2020 3.6    • LV V1 mean PG 11/06/2020 2.0    • LV V1 max 11/06/2020 95.4    • LV V1 mean 11/06/2020 65.1    • LV V1 VTI 11/06/2020 24.6    • SV(Ao) 11/06/2020 465.7    • SI(Ao) 11/06/2020 247.5    • SV(LVOT) 11/06/2020 91.3    • SV(RVOT) 11/06/2020 22.7    • SI(LVOT) 11/06/2020 48.5    • PA V2 max 11/06/2020 85.0    • PA  max PG 11/06/2020 2.9    • PA max PG (full) 11/06/2020 1.8    •  CV ECHO JONNATHAN - PVA(V,* 11/06/2020 1.1    •  CV ECHO JONNATHAN - PVA(V,* 11/06/2020 1.1    • PI end-d pérez 11/06/2020 127.9    • PI max pérez 11/06/2020 191.7    • PI max PG 11/06/2020 14.7    • RV V1 max PG 11/06/2020 1.1    • RV V1 mean PG 11/06/2020 0.65    • RV V1 max 11/06/2020 52.7    • RV V1 mean 11/06/2020 38.9    • RV V1 VTI 11/06/2020 12.9    • TR max pérez 11/06/2020 220.1    • RVSP(TR) 11/06/2020 22.4    • RAP systole 11/06/2020 3.0    • Pulm Sys Pérez 11/06/2020 75.4    • Pulm Cornell Pérez 11/06/2020 47.3    • Pulm S/D 11/06/2020 1.6    • Qp/Qs 11/06/2020 0.25    • Pulm A Revs Dur 11/06/2020 0.09    • Pulm A Revs Pérez 11/06/2020 27.4    •  CV ECHO JONNATHAN - BZI_BMI 11/06/2020 21.3    •  CV ECHO JONNATHAN - BSA(HA* 11/06/2020 1.9    •  CV ECHO JONNATHAN - BZI_ME* 11/06/2020 69.4    •  CV ECHO JONNATHAN - BZI_ME* 11/06/2020 180.3    • EF(MOD-bp) 11/06/2020 72.0    • LA dimension(2D) 11/06/2020 4.2               Invalid input(s): ALKPO4                        Invalid input(s): LDLCALC                Assessment:          Diagnosis Plan   1. Coronary artery disease due to lipid rich plaque     2. Aortic stenosis, severe     3. Mixed hyperlipidemia     4. Primary hypertension            Plan:         1. Coronary artery disease due to lipid rich plaque  Clinically silent. Continue optimize medical therapy secondary prevention    2. Aortic stenosis, severe  Asymptomatic status post transcatheter aortic valve replacement. Continue to observe clinically.    3. Mixed hyperlipidemia  Well-controlled on medical therapy    4. Primary hypertension  Very well controlled on medical therapy.           Karson Maldonado MD  11/03/21  .

## 2022-04-12 NOTE — PROGRESS NOTES
Sleep medicine follow-up visit    Berto Cain   1945  77 y.o. male   DATE OF SERVICE: 4/18/2022     SLEEP TESTING HISTORY:    ISIDRA,  f/u, pt. doing well with pap therapy. pt. uses a full face mask and gets supplies through [email or web address].    Mr. Cain states that he is doing very well with his machine.  He states he had just received the new machine from his response requesting it for the recall.  He states his machine is much quieter and the mask is comfortable more comfortable and he feels like he is doing very well with the machine.  The patient forgot to bring in the chip on his machine for us to download his data and states he will take it to the [employer] and have them send it this way.      Sleep testing history:    On NPSG at [facility] , 02/04/2008 patient had Moderate obstructive sleep apnea syndrome with apnea-hypopnea index of 16 per sleep hour, minimum SpO2 of 89%    The compliance data reviewed and the patient is on CPAP therapy unable to complete this portion of the evaluation as we do not have a smart card report.    EPWORTH SLEEPINESS SCALE  Sitting and reading 0 WatchingTV 0  Sitting, inactive, in a public place 0  As a passenger in a car for 1 hour w/o a break  0  Lying down to rest in the afternoon  0  Sitting and talking to someone  0  Sitting quietly after a lunch  0  In a car, while stopped for traffic or a light  1  Total 1      Review of Systems  I reviewed and addressed ROS entered by MA.    History of Present Illness the patient is doing very well with his current CPAP machine.  He denies any daytime sleepiness.  He states his mask is comfortable and he is very pleased.    The following portions of the patient's history were reviewed and updated as appropriate: allergies, current medications, past family history, past medical history, past social history, past surgical history and problem list.      Family History   Problem Relation Age of Onset   • Heart failure Mother    • Alzheimer's  disease Father    • Heart failure Brother        Past Medical History:   Diagnosis Date   • Cancer (HCC)     Leukemia   • Chest pain, atypical 1/11/2020   • Diabetes mellitus (HCC)    • History of cardiac monitoring 06/29/2018    Underlying NSR, Avg HR 91 bpm, 11 SVT runs with the fastest lasting 4 beats at 250 bpm.   • History of echocardiogram 10/24/2018    TTE showed EF 60-65%, Normal RV, Mild LAE, TAVR leaflets appear to move well, mild paul-valvular regurg, mild MR/TR, RVSP 35 mmHg.   • History of echocardiogram 06/19/2019    Mild concentric LVH, Mild MR/TR, TAVR in place with mild paravalvular AI, EF 61%   • Hyperlipidemia    • Hypertension    • ISIDRA (obstructive sleep apnea)    • Pulmonary hypertension (HCC)    • Stroke (HCC)        Social History     Socioeconomic History   • Marital status:    Tobacco Use   • Smoking status: Never Smoker   • Smokeless tobacco: Never Used   Substance and Sexual Activity   • Alcohol use: No   • Drug use: No   • Sexual activity: Not Currently     Partners: Female         Current Outpatient Medications:   •  amLODIPine (NORVASC) 5 MG tablet, , Disp: , Rfl:   •  aspirin 81 MG EC tablet, Take 81 mg by mouth Daily., Disp: , Rfl:   •  atorvastatin (LIPITOR) 40 MG tablet, Take 40 mg by mouth Every Night., Disp: , Rfl:   •  calcium carbonate-cholecalciferol 500-400 MG-UNIT tablet tablet, Take  by mouth Daily., Disp: , Rfl:   •  hydrocortisone (CORTEF) 5 MG tablet, , Disp: , Rfl:   •  loperamide (IMODIUM) 2 MG capsule, Take 2 mg by mouth 4 (Four) Times a Day As Needed for Diarrhea., Disp: , Rfl:   •  methylPREDNISolone (MEDROL) 4 MG dose pack, , Disp: , Rfl:   •  metoprolol tartrate (LOPRESSOR) 25 MG tablet, Take 25 mg by mouth 2 (Two) Times a Day., Disp: , Rfl:   •  Mirabegron ER (MYRBETRIQ) 50 MG tablet sustained-release 24 hour 24 hr tablet, Take 50 mg by mouth Daily., Disp: , Rfl:   •  multivitamin with minerals tablet tablet, Take 1 tablet by mouth Daily., Disp: , Rfl:   •   nitrofurantoin, macrocrystal-monohydrate, (MACROBID) 100 MG capsule, Take 100 mg by mouth 2 (Two) Times a Day., Disp: , Rfl:   •  ofloxacin (FLOXIN) 0.3 % otic solution, , Disp: , Rfl:   •  Omega-3 Fatty Acids (fish oil) 1000 MG capsule capsule, Take  by mouth Daily With Breakfast., Disp: , Rfl:   •  pantoprazole (PROTONIX) 40 MG EC tablet, Take 40 mg by mouth Daily., Disp: , Rfl:   •  predniSONE (DELTASONE) 20 MG tablet, , Disp: , Rfl:   •  tamsulosin (FLOMAX) 0.4 MG capsule 24 hr capsule, Take 1 capsule by mouth Daily., Disp: , Rfl:   •  Turmeric 500 MG capsule, Take 1 capsule by mouth 2 (Two) Times a Day., Disp: , Rfl:   •  vitamin B-12 (CYANOCOBALAMIN) 100 MCG tablet, Take 50 mcg by mouth Daily., Disp: , Rfl:   •  warfarin (COUMADIN) 7.5 MG tablet, Take 7.5 mg by mouth Every Night. MWF 7.5mg, 5mg QD, Disp: , Rfl:     No Known Allergies     PHYSICAL EXAMINATION:  Vitals:    04/18/22 1353   BP: 121/72   Pulse: 60   Resp: 16   Temp: 98.2 °F (36.8 °C)      Body mass index is 22.32 kg/m².       HEENT: Normal.    CARDIAC: Normal.   LUNGS: Clear to auscultation.   EXTREMITIES: No edema.     Diagnoses and all orders for this visit:    1. ISIDRA (obstructive sleep apnea) (Primary)    Continue using CPAP at least 4 to 6 hours each and every night.  Mask and tubing change every 3 months and follow-up in 1 year.      Return in about 1 year (around 4/18/2023) for Dr Seipel .    I spent10 minutes caring for Berto on this date of service. This time includes time spent by me in the following activities: reviewing tests, obtaining and/or reviewing a separately obtained history, performing a medically appropriate examination and/or evaluation, counseling and educating the patient/family/caregiver and documenting information in the medical record.      This document has been electronically signed by Jeane HANNAH on April 18, 2022 14:50 EDT

## 2022-04-18 ENCOUNTER — OFFICE VISIT (OUTPATIENT)
Dept: NEUROLOGY | Facility: CLINIC | Age: 77
End: 2022-04-18

## 2022-04-18 VITALS
WEIGHT: 160 LBS | TEMPERATURE: 98.2 F | BODY MASS INDEX: 22.4 KG/M2 | HEART RATE: 60 BPM | DIASTOLIC BLOOD PRESSURE: 72 MMHG | RESPIRATION RATE: 16 BRPM | HEIGHT: 71 IN | SYSTOLIC BLOOD PRESSURE: 121 MMHG

## 2022-04-18 DIAGNOSIS — G47.33 OSA (OBSTRUCTIVE SLEEP APNEA): Primary | Chronic | ICD-10-CM

## 2022-04-18 PROBLEM — I63.9 CEREBROVASCULAR ACCIDENT (CVA): Status: ACTIVE | Noted: 2022-04-18

## 2022-04-18 PROBLEM — G62.9 PERIPHERAL NERVE DISEASE: Status: ACTIVE | Noted: 2022-04-18

## 2022-04-18 PROBLEM — I69.354 HEMIPLEGIA AND HEMIPARESIS FOLLOWING CEREBRAL INFARCTION AFFECTING LEFT NON-DOMINANT SIDE (HCC): Status: ACTIVE | Noted: 2020-06-04

## 2022-04-18 PROBLEM — J12.9 VIRAL PNEUMONIA: Status: ACTIVE | Noted: 2022-04-18

## 2022-04-18 PROBLEM — J32.9 SINUSITIS: Status: ACTIVE | Noted: 2022-04-18

## 2022-04-18 PROBLEM — Z93.1 STATUS POST GASTROSTOMY: Status: ACTIVE | Noted: 2020-06-04

## 2022-04-18 PROBLEM — R91.8 OTHER NONSPECIFIC ABNORMAL FINDING OF LUNG FIELD: Status: ACTIVE | Noted: 2020-06-04

## 2022-04-18 PROBLEM — M62.81 GENERALIZED MUSCLE WEAKNESS: Status: ACTIVE | Noted: 2020-06-04

## 2022-04-18 PROBLEM — H91.90 HEARING LOSS: Status: ACTIVE | Noted: 2022-04-18

## 2022-04-18 PROBLEM — I50.9 HEART FAILURE, UNSPECIFIED: Status: ACTIVE | Noted: 2020-06-04

## 2022-04-18 PROBLEM — J40 BRONCHITIS: Status: ACTIVE | Noted: 2022-04-18

## 2022-04-18 PROCEDURE — 99212 OFFICE O/P EST SF 10 MIN: CPT | Performed by: PSYCHIATRY & NEUROLOGY

## 2022-04-18 RX ORDER — HYDROCORTISONE 5 MG/1
TABLET ORAL
COMMUNITY
Start: 2022-03-29 | End: 2023-02-10

## 2022-04-18 RX ORDER — PREDNISONE 20 MG/1
TABLET ORAL
COMMUNITY
Start: 2022-03-16 | End: 2023-02-10

## 2022-04-18 RX ORDER — OFLOXACIN 3 MG/ML
SOLUTION AURICULAR (OTIC)
COMMUNITY
Start: 2022-03-12 | End: 2023-02-10

## 2022-04-18 RX ORDER — AMLODIPINE BESYLATE 5 MG/1
TABLET ORAL DAILY
COMMUNITY
Start: 2022-02-05

## 2022-04-18 RX ORDER — METHYLPREDNISOLONE 4 MG/1
TABLET ORAL
COMMUNITY
Start: 2022-03-03 | End: 2023-02-10

## 2023-01-30 ENCOUNTER — TELEPHONE (OUTPATIENT)
Dept: CARDIOLOGY | Facility: CLINIC | Age: 78
End: 2023-01-30

## 2023-01-30 NOTE — TELEPHONE ENCOUNTER
Caller: Berto Cain    Relationship to patient: Self    Best call back number: [phone]    Patient is needing: FORMER DR. HINSON PATIENT IS REQUESTING TO SEE DR MIRIAM NICHOLS FOR CARDIAC CLEARANCE FOR HERNIA SURGERY. PER [facility] GUIDELINES MUST SEND TO OFFICE TO GET APPROVAL TO SCHEDULE.

## 2023-02-01 ENCOUNTER — TELEPHONE (OUTPATIENT)
Dept: NEUROLOGY | Facility: CLINIC | Age: 78
End: 2023-02-01

## 2023-02-01 NOTE — TELEPHONE ENCOUNTER
Provider: SEIPEL, JOSEPH, MD    Caller: PAOLA    Relationship to Patient: SELF    Phone Number: [phone]    Reason for Call: PT CALLING TO LET PROVIDER KNOW HIS CPAP MACHINE IS NOT USING THE WATER IN THE  CHAMBER.  STATED HE CALLED [facility]) AND WAS TOLD THE HUMIDIFIER WAS NOT ON, SO THEY TURNED IT ON AND PT STATES CPAP IS STILL NOT USING THE WATER.  STATED HE CALLED THE [facility] AGAIN AND WAS TOLD THEY CAN GET PT A REPLACEMENT BUT IT WOULD BE 6 MONTHS FROM NOW.  STATED HE HAS THE DREAM STATION 2 AND THEY CAN REPLACE IT WITH THE RESMED.  PT STATED HE DOES NOT WANT THE RESMED D/T IT CAUSING CANCER. PT CALLING TO SEE IF PROVIDER HAS ANY IDEAS TO HELP.      PLEASE ADVISE      4 = No assist / stand by assistance

## 2023-02-07 ENCOUNTER — TELEPHONE (OUTPATIENT)
Dept: NEUROLOGY | Facility: CLINIC | Age: 78
End: 2023-02-07
Payer: MEDICARE

## 2023-02-07 DIAGNOSIS — G47.33 OBSTRUCTIVE SLEEP APNEA: Primary | ICD-10-CM

## 2023-02-07 NOTE — TELEPHONE ENCOUNTER
SPOKE WITH PT HE WAS MIXED UP ON WHAT CPAP WAS RECALLED. WE ARE GOING TO SEND ORDER TO REPAIR OR REPLACE HUMIDIFIER.

## 2023-02-10 ENCOUNTER — ANTICOAGULATION VISIT (OUTPATIENT)
Dept: CARDIOLOGY | Facility: CLINIC | Age: 78
End: 2023-02-10

## 2023-02-10 ENCOUNTER — OFFICE VISIT (OUTPATIENT)
Dept: CARDIOLOGY | Facility: CLINIC | Age: 78
End: 2023-02-10
Payer: MEDICARE

## 2023-02-10 VITALS
DIASTOLIC BLOOD PRESSURE: 77 MMHG | BODY MASS INDEX: 22.05 KG/M2 | SYSTOLIC BLOOD PRESSURE: 141 MMHG | RESPIRATION RATE: 18 BRPM | HEART RATE: 64 BPM | WEIGHT: 154 LBS | HEIGHT: 70 IN

## 2023-02-10 DIAGNOSIS — Z01.818 PRE-OPERATIVE EXAM: Primary | ICD-10-CM

## 2023-02-10 DIAGNOSIS — E78.2 MIXED HYPERLIPIDEMIA: ICD-10-CM

## 2023-02-10 DIAGNOSIS — I10 HYPERTENSION, UNSPECIFIED TYPE: ICD-10-CM

## 2023-02-10 DIAGNOSIS — I35.0 AORTIC STENOSIS, SEVERE: ICD-10-CM

## 2023-02-10 DIAGNOSIS — K40.91 UNILATERAL RECURRENT INGUINAL HERNIA WITHOUT OBSTRUCTION OR GANGRENE: ICD-10-CM

## 2023-02-10 PROCEDURE — 99214 OFFICE O/P EST MOD 30 MIN: CPT | Performed by: NURSE PRACTITIONER

## 2023-02-10 PROCEDURE — 93000 ELECTROCARDIOGRAM COMPLETE: CPT | Performed by: NURSE PRACTITIONER

## 2023-02-10 RX ORDER — CHOLECALCIFEROL (VITAMIN D3) 25 MCG
1 CAPSULE ORAL DAILY
COMMUNITY
Start: 2023-02-04

## 2023-02-10 NOTE — PROGRESS NOTES
Cardiology Office Follow Up Visit      Primary Care Provider:  Berto Mcmanus MD    Reason for f/u:     Pre operative risk assessment      Subjective     CC:    Pre operative risk assessment    History of Present Illness       Berto Cain is a 78 y.o. male who is a prior patient of Dr. Maldonado and is now transitioning care to Dr. Hill. Pmh includes VHD with severe aortic stenosis status post TAVR 2016, non obstructive CAD per work up pre TAVR, HTN, HLD, prior CVA, normal LV function, h/o prior SVT. He is on anticoagulation with warfarin. Patient reports he was placed on anticoagulation by his PCP, Dr. Mcmanus after his CVA.  Dr. Mcmanus manages his INR.    He presents today for pre operative risk assessment prior to inguinal hernia repair. He states his functional status is somewhat limited by the hernia. He is in pain from hernia. He denies angina. He denies chest pain shortness of breath or exertional symptoms.  His ECG is normal blood pressure is controlled. No edema.           ASSESSMENT/PLAN:      Diagnoses and all orders for this visit:    1. Pre-operative exam (Primary)    2. Unilateral recurrent inguinal hernia without obstruction or gangrene    3. Aortic stenosis, severe  Comments:  s/p TAVR 2016    4. Hypertension, unspecified type    5. Mixed hyperlipidemia        MEDICAL DECISION MAKING:  Mr. Cain presents today for pre operative risk assessment prior to inguinal hernia surgery. He has h/o VHD s/p TAVR in 2016. He has non obstructive CAD. He is angina free, no exertional symptoms. He is at an acceptable risk to proceed with a non cardiac / non vascular surgery. Routine risk reduction measures should be taken. We will see him back in 3 months to see Dr. Hill given he is changing from a different cardiology office.     He may stop anticoagulation prior to surgery.           Past Medical History:   Diagnosis Date   • Cancer (HCC)     Leukemia   • Chest pain, atypical 1/11/2020   • Diabetes  mellitus (HCC)    • History of cardiac monitoring 06/29/2018    Underlying NSR, Avg HR 91 bpm, 11 SVT runs with the fastest lasting 4 beats at 250 bpm.   • History of echocardiogram 10/24/2018    TTE showed EF 60-65%, Normal RV, Mild LAE, TAVR leaflets appear to move well, mild paul-valvular regurg, mild MR/TR, RVSP 35 mmHg.   • History of echocardiogram 06/19/2019    Mild concentric LVH, Mild MR/TR, TAVR in place with mild paravalvular AI, EF 61%   • Hyperlipidemia    • Hypertension    • ISIDRA (obstructive sleep apnea)    • Pulmonary hypertension (HCC)    • Stroke (HCC)        Past Surgical History:   Procedure Laterality Date   • CARDIAC VALVE REPLACEMENT  06/02/2016    TAVR-Dr. Maldonado   • LIVER BIOPSY     • TONSILLECTOMY           Current Outpatient Medications:   •  amLODIPine (NORVASC) 5 MG tablet, Daily., Disp: , Rfl:   •  aspirin 81 MG EC tablet, Take 81 mg by mouth Daily., Disp: , Rfl:   •  atorvastatin (LIPITOR) 40 MG tablet, Take 40 mg by mouth Every Night., Disp: , Rfl:   •  metoprolol tartrate (LOPRESSOR) 25 MG tablet, Take 25 mg by mouth 2 (Two) Times a Day., Disp: , Rfl:   •  Mirabegron ER (MYRBETRIQ) 50 MG tablet sustained-release 24 hour 24 hr tablet, Take 50 mg by mouth Daily., Disp: , Rfl:   •  multivitamin with minerals tablet tablet, Take 1 tablet by mouth Daily., Disp: , Rfl:   •  Omega-3 Fatty Acids (fish oil) 1000 MG capsule capsule, Take 2,000 mg by mouth Daily With Breakfast., Disp: , Rfl:   •  pantoprazole (PROTONIX) 40 MG EC tablet, Take 40 mg by mouth Daily., Disp: , Rfl:   •  tamsulosin (FLOMAX) 0.4 MG capsule 24 hr capsule, Take 1 capsule by mouth Daily., Disp: , Rfl:   •  Turmeric 500 MG capsule, Take 1 capsule by mouth 2 (Two) Times a Day., Disp: , Rfl:   •  vitamin B-12 (CYANOCOBALAMIN) 100 MCG tablet, Take 50 mcg by mouth Daily., Disp: , Rfl:   •  Vitamin D High Potency 25 MCG (1000 UT) capsule, Take 1 capsule by mouth Daily., Disp: , Rfl:   •  warfarin (COUMADIN) 7.5 MG tablet,  "Take 7.5 mg by mouth Every Night. MWF 7.5mg, 5mg QD, Disp: , Rfl:     Social History     Socioeconomic History   • Marital status:    Tobacco Use   • Smoking status: Never   • Smokeless tobacco: Never   Substance and Sexual Activity   • Alcohol use: No   • Drug use: No   • Sexual activity: Not Currently     Partners: Female       Family History   Problem Relation Age of Onset   • Heart failure Mother    • Alzheimer's disease Father    • Heart failure Brother        The following portions of the patient's history were reviewed and updated as appropriate: allergies, current medications, past family history, past medical history, past social history, past surgical history and problem list.    Review of Systems   Constitutional: Negative for chills, diaphoresis and malaise/fatigue.   Cardiovascular: Negative for chest pain, dyspnea on exertion, irregular heartbeat, leg swelling, near-syncope, orthopnea, palpitations, paroxysmal nocturnal dyspnea and syncope.   Respiratory: Negative for cough, shortness of breath, sleep disturbances due to breathing and sputum production.    Gastrointestinal: Negative for change in bowel habit.   Genitourinary: Negative for urgency.   Neurological: Negative for dizziness and headaches.   Psychiatric/Behavioral: Negative for altered mental status.       Pertinent items are noted in HPI, all other systems reviewed and negative    /77 (BP Location: Left arm, Patient Position: Sitting)   Pulse 64   Resp 18   Ht 177.8 cm (70\")   Wt 69.9 kg (154 lb)   BMI 22.10 kg/m² .  Objective     Constitutional:       Appearance: Not in distress.   Neck:      Vascular: JVD normal.   Pulmonary:      Effort: Pulmonary effort is normal.      Breath sounds: Normal breath sounds.   Cardiovascular:      Normal rate. Regular rhythm.   Pulses:     Intact distal pulses.   Edema:     Peripheral edema absent.   Abdominal:      General: Bowel sounds are normal.      Palpations: Abdomen is soft. "   Musculoskeletal: Normal range of motion. Skin:     General: Skin is warm and dry.   Neurological:      General: No focal deficit present.      Mental Status: Oriented to person, place and time.             ECG 12 Lead    Date/Time: 2/10/2023 10:32 AM  Performed by: Nabila Elliott APRN  Authorized by: Nabila Elliott APRN   Comparison: not compared with previous ECG   Previous ECG: no previous ECG available  Rhythm: sinus rhythm  Rate: normal  BPM: 60    Clinical impression: normal ECG            EKG ordered by and reviewed by me in office

## 2023-04-17 NOTE — PROGRESS NOTES
"Chief Complaint  Sleep Apnea    Subjective          Berto Cain presents to [facility]  History of Present Illness  ISIDRA,  f/u patient states he sleeps w/ his machine on and off due to humidifier is not working he uses a FFM and gets supplies through [employer].    Sleep testing history:    On NPSG at [facility] , 02/04/2008 patient had Moderate obstructive sleep apnea syndrome with apnea-hypopnea index of 16 per sleep hour, minimum SpO2 of 89%    PAP download:  The patient is on CPAP therapy at 10-14 cm/H2O.   Data indicates Excellent compliance. With 100% usage for more than 4 hours with an average usage of 7 hours 56 minutes. AHI down to 1.8 .  Average pressures 10.4.  Average large leak 13min.     The patient's hypersomnia has resolved       Jones Sleepiness Scale:  Sitting and reading 0 WatchingTV 1  Sitting, inactive, in a public place 1  As a passenger in a car for 1 hour w/o a break  1  Lying down to rest in the afternoon  1  Sitting and talking to someone  1  Sitting quietly after a lunch  1  In a car, while stopped for traffic or a light  1  Total 7    Review of Systems   Constitutional: Positive for chills.   HENT: Positive for hearing loss, sinus pressure and sore throat.    Respiratory: Positive for apnea.          Objective   Vital Signs:   /63   Pulse 58   Resp 16   Ht 177.8 cm (70\")   Wt 68.9 kg (152 lb)   BMI 21.81 kg/m²     Physical Exam  Vitals reviewed.   Eyes:      Pupils: Pupils are equal, round, and reactive to light.   Cardiovascular:      Rate and Rhythm: Normal rate.   Pulmonary:      Effort: Pulmonary effort is normal. No respiratory distress.   Neurological:      Mental Status: He is alert and oriented to person, place, and time.   Psychiatric:         Mood and Affect: Mood normal.        Result Review :                 Assessment and Plan    Diagnoses and all orders for this visit:    1. ISIDRA (obstructive sleep apnea) (Primary)    continue cpap at " 10-14  The patient is compliant with and benefiting from PAP therapy.      Follow Up   Return in about 1 year (around 4/19/2024).    Patient was given instructions and counseling regarding his condition or for health maintenance advice. Please see specific information pulled into the AVS if appropriate.       This document has been electronically signed by Joseph Seipel, MD on April 19, 2023 09:49 EDT

## 2023-04-19 ENCOUNTER — OFFICE VISIT (OUTPATIENT)
Dept: NEUROLOGY | Facility: CLINIC | Age: 78
End: 2023-04-19
Payer: MEDICARE

## 2023-04-19 VITALS
BODY MASS INDEX: 21.76 KG/M2 | RESPIRATION RATE: 16 BRPM | SYSTOLIC BLOOD PRESSURE: 117 MMHG | WEIGHT: 152 LBS | HEIGHT: 70 IN | DIASTOLIC BLOOD PRESSURE: 63 MMHG | HEART RATE: 58 BPM

## 2023-04-19 DIAGNOSIS — G47.33 OSA (OBSTRUCTIVE SLEEP APNEA): Primary | Chronic | ICD-10-CM

## 2023-04-19 PROCEDURE — 99213 OFFICE O/P EST LOW 20 MIN: CPT | Performed by: PSYCHIATRY & NEUROLOGY

## 2023-04-19 PROCEDURE — 3078F DIAST BP <80 MM HG: CPT | Performed by: PSYCHIATRY & NEUROLOGY

## 2023-04-19 PROCEDURE — 1160F RVW MEDS BY RX/DR IN RCRD: CPT | Performed by: PSYCHIATRY & NEUROLOGY

## 2023-04-19 PROCEDURE — 3074F SYST BP LT 130 MM HG: CPT | Performed by: PSYCHIATRY & NEUROLOGY

## 2023-04-19 PROCEDURE — 1159F MED LIST DOCD IN RCRD: CPT | Performed by: PSYCHIATRY & NEUROLOGY

## 2023-04-19 RX ORDER — WARFARIN SODIUM 5 MG/1
TABLET ORAL
COMMUNITY
Start: 2023-01-30

## 2023-05-02 ENCOUNTER — OFFICE VISIT (OUTPATIENT)
Dept: CARDIOLOGY | Facility: CLINIC | Age: 78
End: 2023-05-02
Payer: MEDICARE

## 2023-05-02 VITALS
HEART RATE: 63 BPM | HEIGHT: 70 IN | BODY MASS INDEX: 21.62 KG/M2 | RESPIRATION RATE: 18 BRPM | SYSTOLIC BLOOD PRESSURE: 121 MMHG | WEIGHT: 151 LBS | DIASTOLIC BLOOD PRESSURE: 64 MMHG

## 2023-05-02 DIAGNOSIS — I10 HYPERTENSION, UNSPECIFIED TYPE: Primary | ICD-10-CM

## 2023-05-02 DIAGNOSIS — I47.1 SVT (SUPRAVENTRICULAR TACHYCARDIA): ICD-10-CM

## 2023-05-02 DIAGNOSIS — I95.1 ORTHOSTATIC HYPOTENSION: ICD-10-CM

## 2023-05-02 DIAGNOSIS — I10 PRIMARY HYPERTENSION: ICD-10-CM

## 2023-05-02 DIAGNOSIS — I25.10 CORONARY ARTERY DISEASE DUE TO LIPID RICH PLAQUE: ICD-10-CM

## 2023-05-02 DIAGNOSIS — I35.0 AORTIC STENOSIS, SEVERE: ICD-10-CM

## 2023-05-02 DIAGNOSIS — E78.2 MIXED HYPERLIPIDEMIA: ICD-10-CM

## 2023-05-02 DIAGNOSIS — I25.83 CORONARY ARTERY DISEASE DUE TO LIPID RICH PLAQUE: ICD-10-CM

## 2023-05-12 NOTE — PROGRESS NOTES
"Cardiology Clinic Note  Wale Hill MD, PhD    Subjective:     Encounter Date:05/02/2023      Patient ID: Berto Cain is a 78 y.o. male.    Chief Complaint:  Chief Complaint   Patient presents with   • Follow-up       HPI:    I the pleasure to see this 78-year-old gentleman.  He is seen by nurse practitioner 2 months ago, blood pressure on follow-up 120/60 with heart rates in the 60s.  His weight is stable at 151.  Medical therapy consist of aspirin amlodipine metoprolol fish oil atorvastatin and Coumadin.  Past medical history consistent with severe restenosis status post TAVR 2016, nonobstructive CAD, hypertension hyperlipidemia prior CVA with normal and preserved LV systolic function with history of prior SVT, chronic anticoagulation with Coumadin without further events.  He is status post inguinal hernia repair.  Felt like anticoagulation was discussed with TAVR valve in place.  He denies any new heart failure signs or symptoms, no new complaints well compensated euvolemic.         Review of systems otherwise negative x14 point review of systems except as mentioned above    Historical data copied forward from previous encounters in EMR including the history, exam, and assessment/plan has been reviewed and is unchanged unless noted otherwise.    Cardiac medicines reviewed with risk, benefits, and necessity of each discussed.    Risk and benefit of cardiac testing reviewed including death heart attack stroke pain bleeding infection need for vascular /cardiovascular surgery were discussed and the patient     Objective:         /64 (BP Location: Right arm, Patient Position: Sitting)   Pulse 63   Resp 18   Ht 177.8 cm (70\")   Wt 68.5 kg (151 lb)   BMI 21.67 kg/m²     RRR no RMG  CTAB  S/nt/nd  Intact grossly  NCAT, PERR  Skin warm dry  Normal pulses.     A/P    HTN  HLD  DM  ISIDRA  VHD with AS s/p TAVR  CP    Secondary prevention goals  Cont present CV meds  ASA  Statin onboard  Coumadin " cont  Amlodipine cont     RTC in 6 months.      The pleasure to be involved in this patient's cardiovascular care.  Please call with any questions or concerns  Wale Hill MD, PhD    Most recent EKG as reviewed and interpreted by me:  Procedures     Most recent echo as reviewed and interpreted by me:  Results for orders placed during the hospital encounter of 11/06/20    Adult Transthoracic Echo Complete W/ Cont if Necessary Per Protocol    Interpretation Summary  · Left ventricular ejection fraction appears to be 61 - 65%. Left ventricular systolic function is normal.  · The left atrial cavity is mildly dilated.  · There is a TAVR valve present.  · Mild aortic valve regurgitation is present.  · Trace to mild mitral valve regurgitation is present.  · Estimated right ventricular systolic pressure from tricuspid regurgitation is normal (<35 mmHg).      Most recent stress test as reviewed and interpreted by me:      Most recent cardiac catheterization as reviewed interpreted by me:  No results found for this or any previous visit.    The following portions of the patient's history were reviewed and updated as appropriate: allergies, current medications, past family history, past medical history, past social history, past surgical history and problem list.      ROS:  14 point review of systems negative except as mentioned above    Current Outpatient Medications:   •  amLODIPine (NORVASC) 5 MG tablet, Daily., Disp: , Rfl:   •  aspirin 81 MG EC tablet, Take 1 tablet by mouth Daily., Disp: , Rfl:   •  atorvastatin (LIPITOR) 40 MG tablet, Take 1 tablet by mouth Every Night., Disp: , Rfl:   •  Jantoven 5 MG tablet, TAKE 1 1/2 TABLETS BY MOUTH DAILY ON MONDAY AND THURSDAY. TAKE ONE TABLET BY MOUTH ALL OTHER DAYS, Disp: , Rfl:   •  metoprolol tartrate (LOPRESSOR) 25 MG tablet, Take 1 tablet by mouth 2 (Two) Times a Day., Disp: , Rfl:   •  Mirabegron ER (MYRBETRIQ) 50 MG tablet sustained-release 24 hour 24 hr tablet, Take 50  mg by mouth Daily., Disp: , Rfl:   •  Omega-3 Fatty Acids (fish oil) 1000 MG capsule capsule, Take 2 capsules by mouth Daily With Breakfast., Disp: , Rfl:   •  pantoprazole (PROTONIX) 40 MG EC tablet, Take 1 tablet by mouth Daily., Disp: , Rfl:   •  tamsulosin (FLOMAX) 0.4 MG capsule 24 hr capsule, Take 1 capsule by mouth Daily., Disp: , Rfl:   •  Turmeric 500 MG capsule, Take 1 capsule by mouth 2 (Two) Times a Day., Disp: , Rfl:   •  vitamin B-12 (CYANOCOBALAMIN) 100 MCG tablet, Take 50 mcg by mouth Daily., Disp: , Rfl:   •  Vitamin D High Potency 25 MCG (1000 UT) capsule, Take 1 capsule by mouth Daily., Disp: , Rfl:   •  warfarin (COUMADIN) 2.5 MG tablet, Take 3 tablets by mouth Every Night. M/thurs 2.5mg, 5mg QD, Disp: , Rfl:     Problem List:  Patient Active Problem List   Diagnosis   • ISIDRA (obstructive sleep apnea)   • Diabetes mellitus   • Hyperlipidemia   • Hypertension   • Pulmonary hypertension   • Coronary artery disease due to lipid rich plaque   • Aortic stenosis, severe   • Chest pain, atypical   • Thrombocytopenia   • S/P allogeneic bone marrow transplant   • Stem cells transplant status   • Insomnia, unspecified   • Acute kidney injury   • Acute myeloblastic leukemia, not having achieved remission   • Acute respiratory failure with hypoxemia   • Benign prostatic hyperplasia   • Bilateral pulmonary infiltrates on chest x-ray   • Cardiac arrest   • Drug-induced hyperglycemia   • Chronic tension-type headache, not intractable   • Early satiety   • Gastroesophageal reflux disease   • Anemia   • GVHD (graft versus host disease)   • Headache   • Hyponatremia   • Intractable nausea and vomiting   • Lymphoma   • Orthostatic hypotension   • Osteonecrosis   • Respiratory acidosis   • Suspected COVID-19 virus infection   • SVT (supraventricular tachycardia)   • Transaminitis   • Hemiplegia and hemiparesis following cerebral infarction affecting left non-dominant side   • Heart failure, unspecified   • Hearing  loss   • Generalized muscle weakness   • Cerebrovascular accident (CVA)   • Bronchitis   • Other nonspecific abnormal finding of lung field   • Peripheral nerve disease   • Sinusitis   • Status post gastrostomy   • Viral pneumonia     Past Medical History:  Past Medical History:   Diagnosis Date   • Cancer     Leukemia   • Chest pain, atypical 1/11/2020   • Diabetes mellitus    • History of cardiac monitoring 06/29/2018    Underlying NSR, Avg HR 91 bpm, 11 SVT runs with the fastest lasting 4 beats at 250 bpm.   • History of echocardiogram 10/24/2018    TTE showed EF 60-65%, Normal RV, Mild LAE, TAVR leaflets appear to move well, mild paul-valvular regurg, mild MR/TR, RVSP 35 mmHg.   • History of echocardiogram 06/19/2019    Mild concentric LVH, Mild MR/TR, TAVR in place with mild paravalvular AI, EF 61%   • Hyperlipidemia    • Hypertension    • ISIDRA (obstructive sleep apnea)    • Pulmonary hypertension    • Stroke      Past Surgical History:  Past Surgical History:   Procedure Laterality Date   • CARDIAC VALVE REPLACEMENT  06/02/2016    TAVR-Dr. Maldonado   • LIVER BIOPSY     • TONSILLECTOMY       Social History:  Social History     Socioeconomic History   • Marital status:    Tobacco Use   • Smoking status: Never   • Smokeless tobacco: Never   Substance and Sexual Activity   • Alcohol use: No   • Drug use: No   • Sexual activity: Not Currently     Partners: Female     Allergies:  No Known Allergies  Immunizations:    There is no immunization history on file for this patient.         In-Office Procedure(s):  No orders to display        ASCVD RIsk Score::  The ASCVD Risk score (Sobia TALAVERA, et al., 2019) failed to calculate for the following reasons:    The patient has a prior MI or stroke diagnosis    Imaging:    Results for orders placed in visit on 11/11/19    SCANNED - IMAGING               Lab Review:   No visits with results within 6 Month(s) from this visit.   Latest known visit with results is:   Hospital  Outpatient Visit on 11/06/2020   Component Date Value   • BSA 11/06/2020 1.9    • IVSd 11/06/2020 0.87    • LVIDd 11/06/2020 5.4    • LVIDs 11/06/2020 3.2    • LVPWd 11/06/2020 0.96    • IVS/LVPW 11/06/2020 0.91    • FS 11/06/2020 40.2    • EDV(Teich) 11/06/2020 141.6    • ESV(Teich) 11/06/2020 42.0    • EF(Teich) 11/06/2020 70.3    • EDV(cubed) 11/06/2020 157.8    • ESV(cubed) 11/06/2020 33.8    • EF(cubed) 11/06/2020 78.6    • LV mass(C)d 11/06/2020 183.7    • LV mass(C)dI 11/06/2020 97.6    • SV(Teich) 11/06/2020 99.6    • SI(Teich) 11/06/2020 52.9    • SV(cubed) 11/06/2020 124.0    • SI(cubed) 11/06/2020 65.9    • Ao root diam 11/06/2020 3.0    • Ao root area 11/06/2020 6.9    • ACS 11/06/2020 1.7    • LVOT diam 11/06/2020 2.2    • LVOT area 11/06/2020 3.7    • RVOT diam 11/06/2020 1.5    • RVOT area 11/06/2020 1.8    • EDV(MOD-sp4) 11/06/2020 83.6    • ESV(MOD-sp4) 11/06/2020 23.5    • EF(MOD-sp4) 11/06/2020 71.8    • SV(MOD-sp4) 11/06/2020 60.0    • SI(MOD-sp4) 11/06/2020 31.9    • Ao root area (BSA correc* 11/06/2020 1.6    • LV Cornell Vol (BSA correct* 11/06/2020 44.4    • LV Sys Vol (BSA correcte* 11/06/2020 12.5    • Ao pk surendra 11/06/2020 271.0    • Ao max PG 11/06/2020 29.5    • Ao max PG (full) 11/06/2020 25.8    • Ao V2 mean 11/06/2020 184.0    • Ao mean PG 11/06/2020 15.5    • Ao mean PG (full) 11/06/2020 13.6    • Ao V2 VTI 11/06/2020 67.8    • HAROLDO(I,A) 11/06/2020 1.3    • HAROLDO(I,D) 11/06/2020 1.3    • HAROLDO(V,A) 11/06/2020 1.3    • HAROLDO(V,D) 11/06/2020 1.3    • LV V1 max PG 11/06/2020 3.6    • LV V1 mean PG 11/06/2020 2.0    • LV V1 max 11/06/2020 95.4    • LV V1 mean 11/06/2020 65.1    • LV V1 VTI 11/06/2020 24.6    • SV(Ao) 11/06/2020 465.7    • SI(Ao) 11/06/2020 247.5    • SV(LVOT) 11/06/2020 91.3    • SV(RVOT) 11/06/2020 22.7    • SI(LVOT) 11/06/2020 48.5    • PA V2 max 11/06/2020 85.0    • PA max PG 11/06/2020 2.9    • PA max PG (full) 11/06/2020 1.8    • BH CV ECHO JONNATHAN - PVA(V,* 11/06/2020 1.1    •   CV ECHO JONNATHAN - PVA(V,* 11/06/2020 1.1    • PI end-d pérez 11/06/2020 127.9    • PI max pérez 11/06/2020 191.7    • PI max PG 11/06/2020 14.7    • RV V1 max PG 11/06/2020 1.1    • RV V1 mean PG 11/06/2020 0.65    • RV V1 max 11/06/2020 52.7    • RV V1 mean 11/06/2020 38.9    • RV V1 VTI 11/06/2020 12.9    • TR max pérez 11/06/2020 220.1    • RVSP(TR) 11/06/2020 22.4    • RAP systole 11/06/2020 3.0    • Pulm Sys Pérez 11/06/2020 75.4    • Pulm Cornell Pérez 11/06/2020 47.3    • Pulm S/D 11/06/2020 1.6    • Qp/Qs 11/06/2020 0.25    • Pulm A Revs Dur 11/06/2020 0.09    • Pulm A Revs Pérez 11/06/2020 27.4    •  CV ECHO JONNATHAN - BZI_BMI 11/06/2020 21.3    •  CV ECHO JONNATHAN - BSA(HA* 11/06/2020 1.9    •  CV ECHO JONNATHAN - BZI_ME* 11/06/2020 69.4    •  CV ECHO JONNATHAN - BZI_ME* 11/06/2020 180.3    • EF(MOD-bp) 11/06/2020 72.0    • LA dimension(2D) 11/06/2020 4.2      Recent labs reviewed and interpreted for clinical significance and application            Level of Care:           Wale Hill MD  05/12/23  .

## 2023-11-16 ENCOUNTER — OFFICE VISIT (OUTPATIENT)
Dept: CARDIOLOGY | Facility: CLINIC | Age: 78
End: 2023-11-16
Payer: MEDICARE

## 2023-11-16 VITALS
SYSTOLIC BLOOD PRESSURE: 138 MMHG | HEART RATE: 71 BPM | WEIGHT: 153 LBS | DIASTOLIC BLOOD PRESSURE: 74 MMHG | HEIGHT: 70 IN | RESPIRATION RATE: 18 BRPM | BODY MASS INDEX: 21.9 KG/M2

## 2023-11-16 DIAGNOSIS — Z95.2 S/P TAVR (TRANSCATHETER AORTIC VALVE REPLACEMENT): Primary | ICD-10-CM

## 2023-11-16 RX ORDER — HYDROCORTISONE 20 MG/1
20 TABLET ORAL DAILY
COMMUNITY

## 2023-11-16 NOTE — PROGRESS NOTES
Cardiology Clinic Note  Wale Hill MD, PhD    Subjective:     Encounter Date:11/16/2023      Patient ID: Berto Cain is a 78 y.o. male.    Chief Complaint:  Chief Complaint   Patient presents with    Follow-up       HPI:     the pleasure to see this 78-year-old gentleman.  He is back for follow-up today.  His weight is stable, no peripheral edema, no anginal chest pain or shortness of breath.  No recent hospitalizations.  Medical therapy consist of aspirin amlodipine metoprolol fish oil atorvastatin and Coumadin.  Past medical history consistent with severe restenosis status post TAVR 2016, nonobstructive CAD, hypertension hyperlipidemia prior CVA with normal and preserved LV systolic function with history of prior SVT, chronic anticoagulation with Coumadin without further events.  He denies any new heart failure signs or symptoms, no new complaints well compensated euvolemic.  He has not had an echo in about 3 years which would be indicated for recheck of his TAVR valve with history of aortic valve stenosis.  He is on otherwise goal-directed medical therapy is indicated per guidelines           Review of systems otherwise negative x14 point review of systems except as mentioned above     Historical data copied forward from previous encounters in EMR including the history, exam, and assessment/plan has been reviewed and is unchanged unless noted otherwise.     Cardiac medicines reviewed with risk, benefits, and necessity of each discussed.     Risk and benefit of cardiac testing reviewed including death heart attack stroke pain bleeding infection need for vascular /cardiovascular surgery were discussed and the patient      Objective:         Vitals reviewed below     RRR, 1 out of 6 stock ejection murmur left sternal border, no diastolic murmur, no rubs gallops heave or lift  CTAB no rales rhonchi's or wheezes  S/nt/nd  Intact grossly  NCAT, PERR, intact grossly  Skin warm dry  Normal pulses.      A/P    "  HTN  HLD  DM  ISIDRA  VHD with AS s/p TAVR  CP     Secondary prevention goals  Cont present CV meds  ASA  Statin onboard  Coumadin cont  Amlodipine cont  Get echo to recheck TAVR valve, nothing since 2020  Diet and exercise per AHA guidelines level functional status  Secondary prevention goals        RTC in 6 months.       Objective:         /74 (BP Location: Right arm, Patient Position: Sitting)   Pulse 71   Resp 18   Ht 177.8 cm (70\")   Wt 69.4 kg (153 lb)   BMI 21.95 kg/m²     Diagnoses and all orders for this visit:    1. S/P TAVR (transcatheter aortic valve replacement) (Primary)  -     Adult Transthoracic Echo Complete W/ Color, Spectral and Contrast if Necessary Per Protocol; Future            The pleasure to be involved in this patient's cardiovascular care.  Please call with any questions or concerns  Wale Hill MD, PhD    Most recent EKG as reviewed and interpreted by me:  Procedures     Most recent echo as reviewed and interpreted by me:  Results for orders placed during the hospital encounter of 11/06/20    Adult Transthoracic Echo Complete W/ Cont if Necessary Per Protocol    Interpretation Summary  · Left ventricular ejection fraction appears to be 61 - 65%. Left ventricular systolic function is normal.  · The left atrial cavity is mildly dilated.  · There is a TAVR valve present.  · Mild aortic valve regurgitation is present.  · Trace to mild mitral valve regurgitation is present.  · Estimated right ventricular systolic pressure from tricuspid regurgitation is normal (<35 mmHg).      Most recent stress test as reviewed and interpreted by me:      Most recent cardiac catheterization as reviewed interpreted by me:  No results found for this or any previous visit.    The following portions of the patient's history were reviewed and updated as appropriate: allergies, current medications, past family history, past medical history, past social history, past surgical history, and problem " list.      ROS:  14 point review of systems negative except as mentioned above    Current Outpatient Medications:     amLODIPine (NORVASC) 5 MG tablet, Daily., Disp: , Rfl:     aspirin 81 MG EC tablet, Take 1 tablet by mouth Daily., Disp: , Rfl:     atorvastatin (LIPITOR) 40 MG tablet, Take 1 tablet by mouth Every Night., Disp: , Rfl:     hydrocortisone (CORTEF) 20 MG tablet, Take 1 tablet by mouth Daily., Disp: , Rfl:     Jantoven 5 MG tablet, TAKE 1 1/2 TABLETS BY MOUTH DAILY ON MONDAY AND THURSDAY. TAKE ONE TABLET BY MOUTH ALL OTHER DAYS, Disp: , Rfl:     metoprolol tartrate (LOPRESSOR) 25 MG tablet, Take 1 tablet by mouth 2 (Two) Times a Day., Disp: , Rfl:     Mirabegron ER (MYRBETRIQ) 50 MG tablet sustained-release 24 hour 24 hr tablet, Take 50 mg by mouth Daily., Disp: , Rfl:     Omega-3 Fatty Acids (fish oil) 1000 MG capsule capsule, Take 2 capsules by mouth Daily With Breakfast., Disp: , Rfl:     pantoprazole (PROTONIX) 40 MG EC tablet, Take 1 tablet by mouth Daily., Disp: , Rfl:     tamsulosin (FLOMAX) 0.4 MG capsule 24 hr capsule, Take 1 capsule by mouth Daily., Disp: , Rfl:     Turmeric 500 MG capsule, Take 1 capsule by mouth 2 (Two) Times a Day., Disp: , Rfl:     vitamin B-12 (CYANOCOBALAMIN) 100 MCG tablet, Take 0.5 tablets by mouth Daily., Disp: , Rfl:     Vitamin D High Potency 25 MCG (1000 UT) capsule, Take 1 capsule by mouth Daily., Disp: , Rfl:     warfarin (COUMADIN) 2.5 MG tablet, Take 3 tablets by mouth Every Night. M/thurs 2.5mg, 5mg QD, Disp: , Rfl:     Inulin (FIBER CHOICE PO), Take  by mouth Daily., Disp: , Rfl:     Problem List:  Patient Active Problem List   Diagnosis    ISIDRA (obstructive sleep apnea)    Diabetes mellitus    Hyperlipidemia    Hypertension    Pulmonary hypertension    Coronary artery disease due to lipid rich plaque    Aortic stenosis, severe    Chest pain, atypical    Thrombocytopenia    S/P allogeneic bone marrow transplant    Stem cells transplant status    Insomnia,  unspecified    Acute kidney injury    Acute myeloblastic leukemia, not having achieved remission    Acute respiratory failure with hypoxemia    Benign prostatic hyperplasia    Bilateral pulmonary infiltrates on chest x-ray    Cardiac arrest    Drug-induced hyperglycemia    Chronic tension-type headache, not intractable    Early satiety    Gastroesophageal reflux disease    Anemia    GVHD (graft versus host disease)    Headache    Hyponatremia    Intractable nausea and vomiting    Lymphoma    Orthostatic hypotension    Osteonecrosis    Respiratory acidosis    Suspected COVID-19 virus infection    SVT (supraventricular tachycardia)    Transaminitis    Hemiplegia and hemiparesis following cerebral infarction affecting left non-dominant side    Heart failure, unspecified    Hearing loss    Generalized muscle weakness    Cerebrovascular accident (CVA)    Bronchitis    Other nonspecific abnormal finding of lung field    Peripheral nerve disease    Sinusitis    Status post gastrostomy    Viral pneumonia     Past Medical History:  Past Medical History:   Diagnosis Date    Cancer     Leukemia    Chest pain, atypical 1/11/2020    Diabetes mellitus     History of cardiac monitoring 06/29/2018    Underlying NSR, Avg HR 91 bpm, 11 SVT runs with the fastest lasting 4 beats at 250 bpm.    History of echocardiogram 10/24/2018    TTE showed EF 60-65%, Normal RV, Mild LAE, TAVR leaflets appear to move well, mild paul-valvular regurg, mild MR/TR, RVSP 35 mmHg.    History of echocardiogram 06/19/2019    Mild concentric LVH, Mild MR/TR, TAVR in place with mild paravalvular AI, EF 61%    Hyperlipidemia     Hypertension     ISIDRA (obstructive sleep apnea)     Pulmonary hypertension     Stroke      Past Surgical History:  Past Surgical History:   Procedure Laterality Date    CARDIAC VALVE REPLACEMENT  06/02/2016    TAVR-Dr. Maldonado    LIVER BIOPSY      TONSILLECTOMY       Social History:  Social History     Socioeconomic History    Marital  status:    Tobacco Use    Smoking status: Never    Smokeless tobacco: Never   Vaping Use    Vaping Use: Never used   Substance and Sexual Activity    Alcohol use: No    Drug use: No    Sexual activity: Not Currently     Partners: Female     Allergies:  No Known Allergies  Immunizations:    There is no immunization history on file for this patient.         In-Office Procedure(s):  No orders to display        ASCVD RIsk Score::  [unfilled]    Imaging:    Results for orders placed in visit on 11/11/19    SCANNED - IMAGING               Lab Review:   No visits with results within 6 Month(s) from this visit.   Latest known visit with results is:   Hospital Outpatient Visit on 11/06/2020   Component Date Value    BSA 11/06/2020 1.9     IVSd 11/06/2020 0.87     LVIDd 11/06/2020 5.4     LVIDs 11/06/2020 3.2     LVPWd 11/06/2020 0.96     IVS/LVPW 11/06/2020 0.91     FS 11/06/2020 40.2     EDV(Teich) 11/06/2020 141.6     ESV(Teich) 11/06/2020 42.0     EF(Teich) 11/06/2020 70.3     EDV(cubed) 11/06/2020 157.8     ESV(cubed) 11/06/2020 33.8     EF(cubed) 11/06/2020 78.6     LV mass(C)d 11/06/2020 183.7     LV mass(C)dI 11/06/2020 97.6     SV(Teich) 11/06/2020 99.6     SI(Teich) 11/06/2020 52.9     SV(cubed) 11/06/2020 124.0     SI(cubed) 11/06/2020 65.9     Ao root diam 11/06/2020 3.0     Ao root area 11/06/2020 6.9     ACS 11/06/2020 1.7     LVOT diam 11/06/2020 2.2     LVOT area 11/06/2020 3.7     RVOT diam 11/06/2020 1.5     RVOT area 11/06/2020 1.8     EDV(MOD-sp4) 11/06/2020 83.6     ESV(MOD-sp4) 11/06/2020 23.5     EF(MOD-sp4) 11/06/2020 71.8     SV(MOD-sp4) 11/06/2020 60.0     SI(MOD-sp4) 11/06/2020 31.9     Ao root area (BSA correc* 11/06/2020 1.6     LV Cornell Vol (BSA correct* 11/06/2020 44.4     LV Sys Vol (BSA correcte* 11/06/2020 12.5     Ao pk surendra 11/06/2020 271.0     Ao max PG 11/06/2020 29.5     Ao max PG (full) 11/06/2020 25.8     Ao V2 mean 11/06/2020 184.0     Ao mean PG 11/06/2020 15.5     Ao mean PG  (full) 11/06/2020 13.6     Ao V2 VTI 11/06/2020 67.8     HAROLDO(I,A) 11/06/2020 1.3     HAROLDO(I,D) 11/06/2020 1.3     HAROLDO(V,A) 11/06/2020 1.3     HAROLDO(V,D) 11/06/2020 1.3     LV V1 max PG 11/06/2020 3.6     LV V1 mean PG 11/06/2020 2.0     LV V1 max 11/06/2020 95.4     LV V1 mean 11/06/2020 65.1     LV V1 VTI 11/06/2020 24.6     SV(Ao) 11/06/2020 465.7     SI(Ao) 11/06/2020 247.5     SV(LVOT) 11/06/2020 91.3     SV(RVOT) 11/06/2020 22.7     SI(LVOT) 11/06/2020 48.5     PA V2 max 11/06/2020 85.0     PA max PG 11/06/2020 2.9     PA max PG (full) 11/06/2020 1.8     BH CV ECHO JONNATHAN - PVA(V,* 11/06/2020 1.1     BH CV ECHO JONNATHAN - PVA(V,* 11/06/2020 1.1     PI end-d pérez 11/06/2020 127.9     PI max pérez 11/06/2020 191.7     PI max PG 11/06/2020 14.7     RV V1 max PG 11/06/2020 1.1     RV V1 mean PG 11/06/2020 0.65     RV V1 max 11/06/2020 52.7     RV V1 mean 11/06/2020 38.9     RV V1 VTI 11/06/2020 12.9     TR max pérez 11/06/2020 220.1     RVSP(TR) 11/06/2020 22.4     RAP systole 11/06/2020 3.0     Pulm Sys Pérez 11/06/2020 75.4     Pulm Cornell Pérez 11/06/2020 47.3     Pulm S/D 11/06/2020 1.6     Qp/Qs 11/06/2020 0.25     Pulm A Revs Dur 11/06/2020 0.09     Pulm A Revs Pérez 11/06/2020 27.4     BH CV ECHO JONNATHAN - BZI_BMI 11/06/2020 21.3     BH CV ECHO JONNATHAN - BSA(HA* 11/06/2020 1.9      CV ECHO JONNATHAN - BZI_ME* 11/06/2020 69.4      CV ECHO JONNATHAN - BZI_ME* 11/06/2020 180.3     EF(MOD-bp) 11/06/2020 72.0     LA dimension(2D) 11/06/2020 4.2      Recent labs reviewed and interpreted for clinical significance and application            Level of Care:           Wale Hill MD  11/16/23  .

## 2023-12-27 ENCOUNTER — HOSPITAL ENCOUNTER (OUTPATIENT)
Dept: CARDIOLOGY | Facility: HOSPITAL | Age: 78
Discharge: HOME | End: 2023-12-27
Admitting: INTERNAL MEDICINE
Payer: MEDICARE

## 2023-12-27 VITALS — BODY MASS INDEX: 21.9 KG/M2 | HEIGHT: 70 IN | WEIGHT: 153 LBS

## 2023-12-27 DIAGNOSIS — Z95.2 S/P TAVR (TRANSCATHETER AORTIC VALVE REPLACEMENT): ICD-10-CM

## 2023-12-27 PROCEDURE — 93306 TTE W/DOPPLER COMPLETE: CPT

## 2023-12-27 PROCEDURE — 93356 MYOCRD STRAIN IMG SPCKL TRCK: CPT

## 2023-12-29 LAB
BH CV ECHO LEFT VENTRICLE GLOBAL LONGITUDINAL STRAIN: -16.6 %
BH CV ECHO MEAS - ACS: 1.5 CM
BH CV ECHO MEAS - AO MAX PG: 16.8 MMHG
BH CV ECHO MEAS - AO MEAN PG: 9 MMHG
BH CV ECHO MEAS - AO V2 MAX: 205 CM/SEC
BH CV ECHO MEAS - AO V2 VTI: 50.4 CM
BH CV ECHO MEAS - AVA(I,D): 1.48 CM2
BH CV ECHO MEAS - EDV(CUBED): 166.4 ML
BH CV ECHO MEAS - EDV(MOD-SP4): 70.1 ML
BH CV ECHO MEAS - EF(MOD-BP): 54 %
BH CV ECHO MEAS - EF(MOD-SP4): 53.9 %
BH CV ECHO MEAS - ESV(CUBED): 54.9 ML
BH CV ECHO MEAS - ESV(MOD-SP4): 32.3 ML
BH CV ECHO MEAS - FS: 30.9 %
BH CV ECHO MEAS - IVS/LVPW: 1 CM
BH CV ECHO MEAS - IVSD: 0.9 CM
BH CV ECHO MEAS - LA DIMENSION: 3.9 CM
BH CV ECHO MEAS - LAT PEAK E' VEL: 8.9 CM/SEC
BH CV ECHO MEAS - LV DIASTOLIC VOL/BSA (35-75): 37.6 CM2
BH CV ECHO MEAS - LV MASS(C)D: 185.8 GRAMS
BH CV ECHO MEAS - LV MAX PG: 3.8 MMHG
BH CV ECHO MEAS - LV MEAN PG: 2 MMHG
BH CV ECHO MEAS - LV SYSTOLIC VOL/BSA (12-30): 17.3 CM2
BH CV ECHO MEAS - LV V1 MAX: 97.5 CM/SEC
BH CV ECHO MEAS - LV V1 VTI: 26.3 CM
BH CV ECHO MEAS - LVIDD: 5.5 CM
BH CV ECHO MEAS - LVIDS: 3.8 CM
BH CV ECHO MEAS - LVOT AREA: 2.8 CM2
BH CV ECHO MEAS - LVOT DIAM: 1.9 CM
BH CV ECHO MEAS - LVPWD: 0.9 CM
BH CV ECHO MEAS - MED PEAK E' VEL: 8.3 CM/SEC
BH CV ECHO MEAS - MR MAX PG: 97.6 MMHG
BH CV ECHO MEAS - MR MAX VEL: 494 CM/SEC
BH CV ECHO MEAS - MR MEAN PG: 67 MMHG
BH CV ECHO MEAS - MR MEAN VEL: 388 CM/SEC
BH CV ECHO MEAS - MR VTI: 184 CM
BH CV ECHO MEAS - MV A MAX VEL: 80.7 CM/SEC
BH CV ECHO MEAS - MV DEC SLOPE: 224 CM/SEC2
BH CV ECHO MEAS - MV DEC TIME: 0.21 SEC
BH CV ECHO MEAS - MV E MAX VEL: 82.8 CM/SEC
BH CV ECHO MEAS - MV E/A: 1.03
BH CV ECHO MEAS - MV MAX PG: 3.4 MMHG
BH CV ECHO MEAS - MV MEAN PG: 2 MMHG
BH CV ECHO MEAS - MV P1/2T: 136 MSEC
BH CV ECHO MEAS - MV V2 VTI: 36.6 CM
BH CV ECHO MEAS - MVA(P1/2T): 1.62 CM2
BH CV ECHO MEAS - MVA(VTI): 2.04 CM2
BH CV ECHO MEAS - PA ACC TIME: 0.18 SEC
BH CV ECHO MEAS - PA V2 MAX: 79.1 CM/SEC
BH CV ECHO MEAS - PI END-D VEL: 113 CM/SEC
BH CV ECHO MEAS - PULM DIAS VEL: 60.3 CM/SEC
BH CV ECHO MEAS - PULM S/D: 1.54
BH CV ECHO MEAS - PULM SYS VEL: 93 CM/SEC
BH CV ECHO MEAS - RAP SYSTOLE: 3 MMHG
BH CV ECHO MEAS - RV MAX PG: 1.21 MMHG
BH CV ECHO MEAS - RV V1 MAX: 55.1 CM/SEC
BH CV ECHO MEAS - RV V1 VTI: 13.1 CM
BH CV ECHO MEAS - RVDD: 3.6 CM
BH CV ECHO MEAS - RVSP: 31.1 MMHG
BH CV ECHO MEAS - SI(MOD-SP4): 20.3 ML/M2
BH CV ECHO MEAS - SV(LVOT): 74.6 ML
BH CV ECHO MEAS - SV(MOD-SP4): 37.8 ML
BH CV ECHO MEAS - TAPSE (>1.6): 2.13 CM
BH CV ECHO MEAS - TR MAX PG: 28.1 MMHG
BH CV ECHO MEAS - TR MAX VEL: 265 CM/SEC
BH CV ECHO MEASUREMENTS AVERAGE E/E' RATIO: 9.63
BH CV XLRA - TDI S': 11.6 CM/SEC
LEFT ATRIUM VOLUME INDEX: 27.5 ML/M2
SINUS: 2.7 CM

## 2024-04-17 NOTE — PROGRESS NOTES
Chief Complaint  Sleep Apnea    Subjective          Berto Cain presents to [facility] NEUROLOGY  History of Present Illness  ISIDRA,  f/u patient states he is benefiting from pap therapy, he uses a FFM and gets supplies through [facility].     Sleep testing history:    On NPSG at [facility] , 02/04/2008 patient had Moderate obstructive sleep apnea syndrome with apnea-hypopnea index of 16 per sleep hour, minimum SpO2 of 89%    PAP download:  The patient is on CPAP therapy at 10-14 cm/H2O.   Data indicates Excellent compliance. With 80% usage for more than 4 hours with an average usage of 8 hours 4 minutes. AHI down to 3.5 .  Average pressures 9.9.  Average large leak 10 MIN.     The patient's hypersomnia has improved       [place] Sleepiness Scale:  Sitting and reading 0 WatchingTV 1  Sitting, inactive, in a public place 0  As a passenger in a car for 1 hour w/o a break  0  Lying down to rest in the afternoon  1  Sitting and talking to someone  0  Sitting quietly after a lunch  1  In a car, while stopped for traffic or a light  0  Total 3    Review of Systems   HENT:  Negative for ear pain and trouble swallowing.    Respiratory:  Positive for apnea.    Gastrointestinal:  Negative for abdominal pain and nausea.   Musculoskeletal:  Positive for back pain. Negative for neck pain.   Neurological:  Negative for dizziness and light-headedness.   All other systems reviewed and are negative.        Objective   Vital Signs:   /69 (BP Location: Left arm, Patient Position: Sitting, Cuff Size: Adult)   Pulse 52   Wt 68.5 kg (151 lb)   BMI 21.67 kg/m²     Physical Exam  Vitals reviewed.   Cardiovascular:      Rate and Rhythm: Normal rate.   Pulmonary:      Effort: Pulmonary effort is normal.   Neurological:      Mental Status: He is alert and oriented to person, place, and time.   Psychiatric:         Mood and Affect: Mood normal.      Result Review :                 Assessment and Plan    Diagnoses and all  orders for this visit:    1. ISIDRA (obstructive sleep apnea) (Primary)    2. Insomnia due to medical condition      CONTINUE CPAP 10-14  The patient is compliant with and benefiting from PAP therapy.      Follow Up   Return in about 1 year (around 4/24/2025).    Patient was given instructions and counseling regarding his condition or for health maintenance advice. Please see specific information pulled into the AVS if appropriate.       This document has been electronically signed by Joseph Seipel, MD on April 24, 2024 09:30 EDT

## 2024-04-24 ENCOUNTER — OFFICE VISIT (OUTPATIENT)
Dept: NEUROLOGY | Facility: CLINIC | Age: 79
End: 2024-04-24
Payer: MEDICARE

## 2024-04-24 VITALS
WEIGHT: 151 LBS | DIASTOLIC BLOOD PRESSURE: 69 MMHG | HEART RATE: 52 BPM | BODY MASS INDEX: 21.67 KG/M2 | SYSTOLIC BLOOD PRESSURE: 125 MMHG

## 2024-04-24 DIAGNOSIS — G47.01 INSOMNIA DUE TO MEDICAL CONDITION: ICD-10-CM

## 2024-04-24 DIAGNOSIS — G47.33 OSA (OBSTRUCTIVE SLEEP APNEA): Primary | Chronic | ICD-10-CM

## 2024-04-24 PROCEDURE — 3074F SYST BP LT 130 MM HG: CPT | Performed by: PSYCHIATRY & NEUROLOGY

## 2024-04-24 PROCEDURE — 99213 OFFICE O/P EST LOW 20 MIN: CPT | Performed by: PSYCHIATRY & NEUROLOGY

## 2024-04-24 PROCEDURE — 1160F RVW MEDS BY RX/DR IN RCRD: CPT | Performed by: PSYCHIATRY & NEUROLOGY

## 2024-04-24 PROCEDURE — 3078F DIAST BP <80 MM HG: CPT | Performed by: PSYCHIATRY & NEUROLOGY

## 2024-04-24 PROCEDURE — 1159F MED LIST DOCD IN RCRD: CPT | Performed by: PSYCHIATRY & NEUROLOGY

## 2024-07-05 ENCOUNTER — TRANSCRIBE ORDERS (OUTPATIENT)
Dept: ADMINISTRATIVE | Facility: HOSPITAL | Age: 79
End: 2024-07-05
Payer: MEDICARE

## 2024-07-05 DIAGNOSIS — R06.02 SHORTNESS OF BREATH: ICD-10-CM

## 2024-07-05 DIAGNOSIS — R91.8 LUNG NODULES: Primary | ICD-10-CM

## 2024-08-01 ENCOUNTER — HOSPITAL ENCOUNTER (OUTPATIENT)
Dept: RESPIRATORY THERAPY | Facility: HOSPITAL | Age: 79
Discharge: HOME | End: 2024-08-01
Payer: MEDICARE

## 2024-08-01 ENCOUNTER — HOSPITAL ENCOUNTER (OUTPATIENT)
Dept: CT IMAGING | Facility: HOSPITAL | Age: 79
Discharge: HOME | End: 2024-08-01
Payer: MEDICARE

## 2024-08-01 DIAGNOSIS — R06.02 SHORTNESS OF BREATH: ICD-10-CM

## 2024-08-01 DIAGNOSIS — R91.8 LUNG NODULES: ICD-10-CM

## 2024-08-01 PROCEDURE — 94726 PLETHYSMOGRAPHY LUNG VOLUMES: CPT

## 2024-08-01 PROCEDURE — 94729 DIFFUSING CAPACITY: CPT

## 2024-08-01 PROCEDURE — 71250 CT THORAX DX C-: CPT

## 2024-08-01 PROCEDURE — 94010 BREATHING CAPACITY TEST: CPT

## 2024-08-19 ENCOUNTER — OFFICE VISIT (OUTPATIENT)
Dept: CARDIOLOGY | Facility: CLINIC | Age: 79
End: 2024-08-19
Payer: MEDICARE

## 2024-08-19 VITALS
BODY MASS INDEX: 20.62 KG/M2 | DIASTOLIC BLOOD PRESSURE: 69 MMHG | HEIGHT: 70 IN | RESPIRATION RATE: 18 BRPM | SYSTOLIC BLOOD PRESSURE: 119 MMHG | HEART RATE: 68 BPM | WEIGHT: 144 LBS

## 2024-08-19 DIAGNOSIS — I25.83 CORONARY ARTERY DISEASE DUE TO LIPID RICH PLAQUE: ICD-10-CM

## 2024-08-19 DIAGNOSIS — I10 PRIMARY HYPERTENSION: ICD-10-CM

## 2024-08-19 DIAGNOSIS — I27.20 PULMONARY HYPERTENSION: ICD-10-CM

## 2024-08-19 DIAGNOSIS — Z95.2 S/P TAVR (TRANSCATHETER AORTIC VALVE REPLACEMENT): Primary | ICD-10-CM

## 2024-08-19 DIAGNOSIS — E78.2 MIXED HYPERLIPIDEMIA: ICD-10-CM

## 2024-08-19 DIAGNOSIS — I25.10 CORONARY ARTERY DISEASE DUE TO LIPID RICH PLAQUE: ICD-10-CM

## 2024-08-19 PROCEDURE — 3078F DIAST BP <80 MM HG: CPT | Performed by: INTERNAL MEDICINE

## 2024-08-19 PROCEDURE — 3074F SYST BP LT 130 MM HG: CPT | Performed by: INTERNAL MEDICINE

## 2024-08-19 PROCEDURE — 99214 OFFICE O/P EST MOD 30 MIN: CPT | Performed by: INTERNAL MEDICINE

## 2024-08-19 PROCEDURE — G2211 COMPLEX E/M VISIT ADD ON: HCPCS | Performed by: INTERNAL MEDICINE

## 2024-08-19 RX ORDER — AMLODIPINE BESYLATE 2.5 MG/1
2.5 TABLET ORAL DAILY
Qty: 90 TABLET | Refills: 3 | Status: SHIPPED | OUTPATIENT
Start: 2024-08-19

## 2024-08-19 NOTE — PROGRESS NOTES
Cardiology Clinic Note  Wale Hill MD, PhD    Subjective:     Encounter Date:08/19/2024      Patient ID: Berto Cain is a 79 y.o. male.    Chief Complaint:  Chief Complaint   Patient presents with    Follow-up       HPI:     the pleasure to see this 79-year-old gentleman.  He is back for follow-up today.  His weight is down nearly 10 pounds year every year.  He says he is eating very well also does not explain why he is losing weight, he appears mildly volume depleted today, had a recent fall mechanical in nature bruising his right side pretty good and was examined today with various levels of bruising and healing, no peripheral edema, no anginal chest pain, he has shortness of breath and dyspnea on exertion possibly secondary to volume depletion, normal transvalvular gradients with TAVR valve in place with normal EF this past year for surveillance.  No recent hospitalizations.  Medical therapy consist of aspirin amlodipine metoprolol fish oil atorvastatin and Coumadin with history of stroke.  Past medical history consistent with severe aortic valve stenosis status post TAVR 2016, nonobstructive CAD, hypertension hyperlipidemia prior CVA with normal and preserved LV systolic function with history of prior SVT, chronic anticoagulation with Coumadin without further events.          Review of systems otherwise negative x14 point review of systems except as mentioned above     Historical data copied forward from previous encounters in EMR including the history, exam, and assessment/plan has been reviewed and is unchanged unless noted otherwise.     Cardiac medicines reviewed with risk, benefits, and necessity of each discussed.     Risk and benefit of cardiac testing reviewed including death heart attack stroke pain bleeding infection need for vascular /cardiovascular surgery were discussed and the patient      Objective:         Vitals reviewed below    RRR, 1 out of 6 stock ejection murmur left sternal  "border, no diastolic murmur, no rubs gallops heave or lift  CTAB no rales rhonchi's or wheezes  S/nt/nd  Intact grossly  NCAT, PERR, intact grossly  Skin warm dry  Normal pulses.   Unchanged from prior    Assessment and plan per my encounter  Independently manage medical conditions     HTN  HLD  DM  ISIDRA  VHD with AS s/p TAVR  CP  Weight loss unexplained     Secondary prevention goals  Cont present CV meds  ASA  Statin onboard  Coumadin cont  Amlodipine cont  Fall precautions, bruise right sided presently with fall on Coumadin at home, goal INR is 2-3 with history of stroke  2D echo reveals mildly elevated but normal gradients for this TAVR valve, EF 55% 2023, normal diastolic function by mitral inflow criteria with normal filling pressures estimated  Diet and exercise per AHA guidelines level functional status  Unexplained weight loss addressed, says he is eating well, had a CT scan did not demonstrate any malignancy, advised calorie count, greater than 2000 bassam/day, if still continues to lose weight send explained would recommend referral to GI colonoscopy another surveillance for poss malignancy if he is truly getting more than 2000 bassam in per day  Secondary prevention goals    See him back in 1 year    Wale Hill MD, PhD    Objective:         /69 (BP Location: Right arm, Patient Position: Sitting)   Pulse 68   Resp 18   Ht 177.8 cm (70\")   Wt 65.3 kg (144 lb)   BMI 20.66 kg/m²     Physical Exam    Assessment:         There are no diagnoses linked to this encounter.       Plan:              The pleasure to be involved in this patient's cardiovascular care.  Please call with any questions or concerns  Wale Hill MD, PhD    Most recent EKG as reviewed and interpreted by me:  Procedures     Most recent echo as reviewed and interpreted by me:  Results for orders placed during the hospital encounter of 12/27/23    Adult Transthoracic Echo Complete W/ Color, Spectral and Contrast if Necessary Per " Protocol    Interpretation Summary    Left ventricular systolic function is normal. Left ventricular ejection fraction appears to be 51 - 55%.    Left ventricular diastolic function was normal.    Mild aortic valve stenosis is present.    Estimated right ventricular systolic pressure from tricuspid regurgitation is normal (<35 mmHg).      Most recent stress test as reviewed and interpreted by me:      Most recent cardiac catheterization as reviewed interpreted by me:  No results found for this or any previous visit.    The following portions of the patient's history were reviewed and updated as appropriate: allergies, current medications, past family history, past medical history, past social history, past surgical history, and problem list.      ROS:  14 point review of systems negative except as mentioned above    Current Outpatient Medications:     amLODIPine (NORVASC) 5 MG tablet, Daily., Disp: , Rfl:     aspirin 81 MG EC tablet, Take 1 tablet by mouth Daily., Disp: , Rfl:     atorvastatin (LIPITOR) 40 MG tablet, Take 1 tablet by mouth Every Night., Disp: , Rfl:     hydrocortisone (CORTEF) 20 MG tablet, Take 1 tablet by mouth Daily., Disp: , Rfl:     Inulin (FIBER CHOICE PO), Take  by mouth Daily., Disp: , Rfl:     Jantoven 5 MG tablet, TAKE 1 1/2 TABLETS BY MOUTH DAILY ON MONDAY AND THURSDAY. TAKE ONE TABLET BY MOUTH ALL OTHER DAYS, Disp: , Rfl:     metoprolol tartrate (LOPRESSOR) 25 MG tablet, Take 1 tablet by mouth 2 (Two) Times a Day., Disp: , Rfl:     Mirabegron ER (MYRBETRIQ) 50 MG tablet sustained-release 24 hour 24 hr tablet, Take 50 mg by mouth Daily., Disp: , Rfl:     Omega-3 Fatty Acids (fish oil) 1000 MG capsule capsule, Take 2 capsules by mouth Daily With Breakfast., Disp: , Rfl:     pantoprazole (PROTONIX) 40 MG EC tablet, Take 1 tablet by mouth Daily., Disp: , Rfl:     tamsulosin (FLOMAX) 0.4 MG capsule 24 hr capsule, Take 1 capsule by mouth Daily., Disp: , Rfl:     Turmeric 500 MG capsule, Take 1  capsule by mouth 2 (Two) Times a Day., Disp: , Rfl:     vitamin B-12 (CYANOCOBALAMIN) 100 MCG tablet, Take 0.5 tablets by mouth Daily., Disp: , Rfl:     Vitamin D High Potency 25 MCG (1000 UT) capsule, Take 1 capsule by mouth Daily., Disp: , Rfl:     warfarin (COUMADIN) 2.5 MG tablet, Take 3 tablets by mouth Every Night. M/thurs 2.5mg, 5mg QD, Disp: , Rfl:     Problem List:  Patient Active Problem List   Diagnosis    ISIDRA (obstructive sleep apnea)    Diabetes mellitus    Hyperlipidemia    Hypertension    Pulmonary hypertension    Coronary artery disease due to lipid rich plaque    Aortic stenosis, severe    Chest pain, atypical    Thrombocytopenia    S/P allogeneic bone marrow transplant    Stem cells transplant status    Insomnia, unspecified    Acute kidney injury    Acute myeloblastic leukemia, not having achieved remission    Acute respiratory failure with hypoxemia    Benign prostatic hyperplasia    Bilateral pulmonary infiltrates on chest x-ray    Cardiac arrest    Drug-induced hyperglycemia    Chronic tension-type headache, not intractable    Early satiety    Gastroesophageal reflux disease    Anemia    GVHD (graft versus host disease)    Headache    Hyponatremia    Intractable nausea and vomiting    Lymphoma    Orthostatic hypotension    Osteonecrosis    Respiratory acidosis    Suspected COVID-19 virus infection    SVT (supraventricular tachycardia)    Transaminitis    Hemiplegia and hemiparesis following cerebral infarction affecting left non-dominant side    Heart failure, unspecified    Hearing loss    Generalized muscle weakness    Cerebrovascular accident (CVA)    Bronchitis    Other nonspecific abnormal finding of lung field    Peripheral nerve disease    Sinusitis    Status post gastrostomy    Viral pneumonia     Past Medical History:  Past Medical History:   Diagnosis Date    Cancer     Leukemia    Chest pain, atypical 1/11/2020    Diabetes mellitus     History of cardiac monitoring 06/29/2018     Underlying NSR, Avg HR 91 bpm, 11 SVT runs with the fastest lasting 4 beats at 250 bpm.    History of echocardiogram 10/24/2018    TTE showed EF 60-65%, Normal RV, Mild LAE, TAVR leaflets appear to move well, mild paul-valvular regurg, mild MR/TR, RVSP 35 mmHg.    History of echocardiogram 06/19/2019    Mild concentric LVH, Mild MR/TR, TAVR in place with mild paravalvular AI, EF 61%    Hyperlipidemia     Hypertension     ISIDRA (obstructive sleep apnea)     Pulmonary hypertension     Stroke      Past Surgical History:  Past Surgical History:   Procedure Laterality Date    CARDIAC VALVE REPLACEMENT  06/02/2016    TAVR-Dr. Maldonado    LIVER BIOPSY      TONSILLECTOMY       Social History:  Social History     Socioeconomic History    Marital status:    Tobacco Use    Smoking status: Never    Smokeless tobacco: Never   Vaping Use    Vaping status: Never Used   Substance and Sexual Activity    Alcohol use: No    Drug use: No    Sexual activity: Not Currently     Partners: Female     Allergies:  No Known Allergies  Immunizations:  Immunization History   Administered Date(s) Administered    COVID-19 (PFIZER) Purple Cap Monovalent 02/06/2021, 02/27/2021            In-Office Procedure(s):  No orders to display        ASCVD RIsk Score::  The ASCVD Risk score (Sobia DK, et al., 2019) failed to calculate for the following reasons:    The patient has a prior MI or stroke diagnosis    Imaging:    Results for orders placed in visit on 11/11/19    SCANNED - IMAGING       Results for orders placed during the hospital encounter of 08/01/24    CT Chest Without Contrast Diagnostic    Narrative  CT CHEST WO CONTRAST DIAGNOSTIC    Date of Exam: 8/1/2024 3:24 PM EDT    Indication: Lung nodules.    Comparison: PET/CT 1/4/2023 from [facility].    Technique: Axial CT images were obtained of the chest without contrast administration.  Sagittal and coronal reconstructions were performed.  Automated exposure control and iterative  reconstruction methods were used.    Findings:  11 x 10 mm part solid subpleural right upper lobe nodule on axial image 19 is stable compared to 1/4/2023 PET/CT. Solid component measures less than 6 mm. 8 x 3 mm subpleural nodular opacity in the right upper lobe on axial image 17 is also stable.  Patchy groundglass opacities seen on previous PET/CT in the right upper lobe and left lower lobe have resolved.    No pleural or pericardial effusion. Normal heart size. Coronary artery calcifications noted. No lymphadenopathy seen in the chest. Cholelithiasis is included. No acute or worrisome osseous abnormality is identified.    Impression  Impression:  1.Stable size of 1.1 cm part solid subpleural right upper lobe nodule and 8 mm subpleural right upper lobe nodule compared to 1/4/2023 PET CT, where these were not hypermetabolic.  2.Interval resolution of previously seen patchy groundglass opacities in the right upper lobe and left lower lobe compared to 1/4/2023 CT.    Indeterminate part solid pulmonary nodule measuring 11 mm. For a part solid nodule >=6 mm, recommend CT at 3-6 months to confirm persistence. If unchanged and a solid component remains <6 mm, annual CT should be performed for 5 years. A persistent part  solid nodule with a solid component >=6 mm is highly suspicious.        Electronically Signed: Ashly Thomas  8/2/2024 12:59 PM EDT  Workstation ID: [id number]      Results for orders placed during the hospital encounter of 08/01/24    CT Chest Without Contrast Diagnostic    Narrative  CT CHEST WO CONTRAST DIAGNOSTIC    Date of Exam: 8/1/2024 3:24 PM EDT    Indication: Lung nodules.    Comparison: PET/CT 1/4/2023 from [facility].    Technique: Axial CT images were obtained of the chest without contrast administration.  Sagittal and coronal reconstructions were performed.  Automated exposure control and iterative reconstruction methods were used.    Findings:  11 x 10 mm part solid subpleural right  upper lobe nodule on axial image 19 is stable compared to 1/4/2023 PET/CT. Solid component measures less than 6 mm. 8 x 3 mm subpleural nodular opacity in the right upper lobe on axial image 17 is also stable.  Patchy groundglass opacities seen on previous PET/CT in the right upper lobe and left lower lobe have resolved.    No pleural or pericardial effusion. Normal heart size. Coronary artery calcifications noted. No lymphadenopathy seen in the chest. Cholelithiasis is included. No acute or worrisome osseous abnormality is identified.    Impression  Impression:  1.Stable size of 1.1 cm part solid subpleural right upper lobe nodule and 8 mm subpleural right upper lobe nodule compared to 1/4/2023 PET CT, where these were not hypermetabolic.  2.Interval resolution of previously seen patchy groundglass opacities in the right upper lobe and left lower lobe compared to 1/4/2023 CT.    Indeterminate part solid pulmonary nodule measuring 11 mm. For a part solid nodule >=6 mm, recommend CT at 3-6 months to confirm persistence. If unchanged and a solid component remains <6 mm, annual CT should be performed for 5 years. A persistent part  solid nodule with a solid component >=6 mm is highly suspicious.        Electronically Signed: Ashly Thomas  8/2/2024 12:59 PM EDT  Workstation ID: [id number]      Lab Review:   No visits with results within 6 Month(s) from this visit.   Latest known visit with results is:   Hospital Outpatient Visit on 12/27/2023   Component Date Value    LV GLOBAL STRAIN  12/27/2023 -16.6     LVIDd 12/27/2023 5.5     LVIDs 12/27/2023 3.8     IVSd 12/27/2023 0.90     LVPWd 12/27/2023 0.90     FS 12/27/2023 30.9     IVS/LVPW 12/27/2023 1.00     ESV(cubed) 12/27/2023 54.9     LV Sys Vol (BSA correcte* 12/27/2023 17.3     EDV(cubed) 12/27/2023 166.4     LV Cornell Vol (BSA correct* 12/27/2023 37.6     LV mass(C)d 12/27/2023 185.8     LVOT area 12/27/2023 2.8     LVOT diam 12/27/2023 1.90     EDV(MOD-sp4)  12/27/2023 70.1     ESV(MOD-sp4) 12/27/2023 32.3     SV(MOD-sp4) 12/27/2023 37.8     SVi(MOD-SP4) 12/27/2023 20.3     EF(MOD-sp4) 12/27/2023 53.9     MV E max pérez 12/27/2023 82.8     MV A max pérez 12/27/2023 80.7     MV dec time 12/27/2023 0.21     MV E/A 12/27/2023 1.03     LA ESV Index (BP) 12/27/2023 27.5     Med Peak E' Pérez 12/27/2023 8.3     Lat Peak E' Pérez 12/27/2023 8.9     TR max pérez 12/27/2023 265.0     Avg E/e' ratio 12/27/2023 9.63     SV(LVOT) 12/27/2023 74.6     RVIDd 12/27/2023 3.6     TAPSE (>1.6) 12/27/2023 2.13     RV S' 12/27/2023 11.6     LA dimension (2D)  12/27/2023 3.9     Pulm Sys Pérez 12/27/2023 93.0     Pulm Cornell Pérez 12/27/2023 60.3     Pulm S/D 12/27/2023 1.54     LV V1 max 12/27/2023 97.5     LV V1 max PG 12/27/2023 3.8     LV V1 mean PG 12/27/2023 2.00     LV V1 VTI 12/27/2023 26.3     Ao pk pérez 12/27/2023 205.0     Ao max PG 12/27/2023 16.8     Ao mean PG 12/27/2023 9.0     Ao V2 VTI 12/27/2023 50.4     HAROLDO(I,D) 12/27/2023 1.48     MV max PG 12/27/2023 3.4     MV mean PG 12/27/2023 2.00     MV V2 VTI 12/27/2023 36.6     MV P1/2t 12/27/2023 136.0     MVA(P1/2t) 12/27/2023 1.62     MVA(VTI) 12/27/2023 2.04     MV dec slope 12/27/2023 224.0     MR max pérez 12/27/2023 494.0     MR max PG 12/27/2023 97.6     MR mean pérez 12/27/2023 388.0     MR mean PG 12/27/2023 67.0     MR VTI 12/27/2023 184.0     TR max PG 12/27/2023 28.1     RVSP(TR) 12/27/2023 31.1     RAP systole 12/27/2023 3.0     RV V1 max PG 12/27/2023 1.21     RV V1 max 12/27/2023 55.1     RV V1 VTI 12/27/2023 13.1     PA V2 max 12/27/2023 79.1     PA acc time 12/27/2023 0.18     PI end-d pérez 12/27/2023 113.0     ACS 12/27/2023 1.50     Sinus 12/27/2023 2.7     EF(MOD-bp) 12/27/2023 54.0      Recent labs reviewed and interpreted for clinical significance and application            Level of Care:           Wale Hill MD  08/19/24  .

## 2024-09-12 ENCOUNTER — TRANSCRIBE ORDERS (OUTPATIENT)
Dept: ADMINISTRATIVE | Facility: HOSPITAL | Age: 79
End: 2024-09-12
Payer: MEDICARE

## 2024-09-12 DIAGNOSIS — R91.8 LUNG NODULES: Primary | ICD-10-CM

## 2024-10-04 ENCOUNTER — HOSPITAL ENCOUNTER (OUTPATIENT)
Dept: CT IMAGING | Facility: HOSPITAL | Age: 79
Discharge: HOME | End: 2024-10-04
Payer: MEDICARE

## 2024-10-04 DIAGNOSIS — R91.8 LUNG NODULES: ICD-10-CM

## 2024-10-04 PROCEDURE — 71250 CT THORAX DX C-: CPT

## 2025-03-04 ENCOUNTER — TRANSCRIBE ORDERS (OUTPATIENT)
Dept: ADMINISTRATIVE | Facility: HOSPITAL | Age: 80
End: 2025-03-04
Payer: MEDICARE

## 2025-03-04 DIAGNOSIS — R91.8 MULTIPLE LUNG NODULES: Primary | ICD-10-CM

## 2025-03-19 ENCOUNTER — OFFICE VISIT (OUTPATIENT)
Dept: ORTHOPEDIC SURGERY | Facility: CLINIC | Age: 80
End: 2025-03-19
Payer: MEDICARE

## 2025-03-19 VITALS — RESPIRATION RATE: 20 BRPM | OXYGEN SATURATION: 97 % | BODY MASS INDEX: 23.62 KG/M2 | HEIGHT: 70 IN | WEIGHT: 165 LBS

## 2025-03-19 DIAGNOSIS — M17.11 PRIMARY OSTEOARTHRITIS OF RIGHT KNEE: Primary | ICD-10-CM

## 2025-03-19 RX ADMIN — TRIAMCINOLONE ACETONIDE 80 MG: 40 INJECTION, SUSPENSION INTRA-ARTICULAR; INTRAMUSCULAR at 10:26

## 2025-03-19 RX ADMIN — LIDOCAINE HYDROCHLORIDE 2 ML: 10 INJECTION, SOLUTION EPIDURAL; INFILTRATION; INTRACAUDAL; PERINEURAL at 10:26

## 2025-03-20 ENCOUNTER — PATIENT ROUNDING (BHMG ONLY) (OUTPATIENT)
Dept: ORTHOPEDIC SURGERY | Facility: CLINIC | Age: 80
End: 2025-03-20
Payer: MEDICARE

## 2025-03-20 RX ORDER — TRIAMCINOLONE ACETONIDE 40 MG/ML
80 INJECTION, SUSPENSION INTRA-ARTICULAR; INTRAMUSCULAR
Status: COMPLETED | OUTPATIENT
Start: 2025-03-19 | End: 2025-03-19

## 2025-03-20 RX ORDER — LIDOCAINE HYDROCHLORIDE 10 MG/ML
2 INJECTION, SOLUTION EPIDURAL; INFILTRATION; INTRACAUDAL; PERINEURAL
Status: COMPLETED | OUTPATIENT
Start: 2025-03-19 | End: 2025-03-19

## 2025-03-20 NOTE — PROGRESS NOTES
[facility]        Patient Name: Berto Cain  : 1945  Primary Care Physician: Berto Mcmanus MD        Chief Complaint:    Chief Complaint   Patient presents with    Right Knee - Pain, Initial Evaluation     Swelling for about 1 week          HPI:   History of Present Illness  The patient presents for evaluation of right knee pain.    He reports the onset of right knee pain last Friday, which has been severe enough to disrupt his sleep. He was evaluated at an urgent care center where a brace was applied to his knee. He also notes swelling in the knee but does not recall any specific injury or trauma. The knee appears warm upon removal of the brace, but he attributes this to the brace itself. He has no history of gout. Weight-bearing on the affected knee is painful, but he can fully extend the leg without discomfort.     However, flexion of the knee is painful. The pain was initially localized to a specific area but has since become more generalized. He has not taken any over-the-counter analgesics such as Tylenol or ibuprofen for relief. He has a known diagnosis of arthritis affecting the entire body. He was previously prescribed a tapering dose of prednisone by his primary care physician, which provided some relief.     Supplemental Information  He also wears an AFO brace on his leg due to toe dragging.              Past Medical/Surgical, Social and Family History:  I have reviewed and/or updated pertinent history as noted in the medical record including:  Past Medical History:   Diagnosis Date    Cancer     Leukemia    Chest pain, atypical 2020    Diabetes mellitus     History of cardiac monitoring 2018    Underlying NSR, Avg HR 91 bpm, 11 SVT runs with the fastest lasting 4 beats at 250 bpm.    History of echocardiogram 10/24/2018    TTE showed EF 60-65%, Normal RV, Mild LAE, TAVR leaflets appear to move well, mild paul-valvular regurg, mild MR/TR, RVSP 35 mmHg.    History of  echocardiogram 06/19/2019    Mild concentric LVH, Mild MR/TR, TAVR in place with mild paravalvular AI, EF 61%    Hyperlipidemia     Hypertension     Lung nodules 08/01/2024    ISIDRA (obstructive sleep apnea)     Pulmonary hypertension     Stroke      Past Surgical History:   Procedure Laterality Date    CARDIAC VALVE REPLACEMENT  06/02/2016    TAVR-Dr. Maldonado    HERNIA REPAIR Left 11/11/2024    LIVER BIOPSY      TONSILLECTOMY       Social History     Occupational History    Not on file   Tobacco Use    Smoking status: Never     Passive exposure: Never    Smokeless tobacco: Never   Vaping Use    Vaping status: Never Used   Substance and Sexual Activity    Alcohol use: No    Drug use: No    Sexual activity: Not Currently     Partners: Female          Allergies: No Known Allergies    Medications:   Home Medications:  Current Outpatient Medications on File Prior to Visit   Medication Sig    amLODIPine (NORVASC) 2.5 MG tablet Take 1 tablet by mouth Daily.    aspirin 81 MG EC tablet Take 1 tablet by mouth Daily.    atorvastatin (LIPITOR) 40 MG tablet Take 1 tablet by mouth Every Night.    benzonatate (TESSALON) 100 MG capsule Take 2 capsules by mouth 3 (Three) Times a Day As Needed for Cough.    hydrocortisone (CORTEF) 20 MG tablet Take 1 tablet by mouth Daily.    Inulin (FIBER CHOICE PO) Take  by mouth Daily.    Jantoven 5 MG tablet TAKE 1 1/2 TABLETS BY MOUTH DAILY ON MONDAY AND THURSDAY. TAKE ONE TABLET BY MOUTH ALL OTHER DAYS    metoprolol tartrate (LOPRESSOR) 25 MG tablet Take 1 tablet by mouth 2 (Two) Times a Day.    Mirabegron ER (MYRBETRIQ) 50 MG tablet sustained-release 24 hour 24 hr tablet Take 50 mg by mouth Daily.    Omega-3 Fatty Acids (fish oil) 1000 MG capsule capsule Take 2 capsules by mouth Daily With Breakfast.    pantoprazole (PROTONIX) 40 MG EC tablet Take 1 tablet by mouth Daily.    tamsulosin (FLOMAX) 0.4 MG capsule 24 hr capsule Take 1 capsule by mouth Daily.    Turmeric 500 MG capsule Take 1  capsule by mouth 2 (Two) Times a Day.    vitamin B-12 (CYANOCOBALAMIN) 100 MCG tablet Take 0.5 tablets by mouth Daily.    Vitamin D High Potency 25 MCG (1000 UT) capsule Take 1 capsule by mouth Daily.    warfarin (COUMADIN) 2.5 MG tablet Take 3 tablets by mouth Every Night. M/thurs 2.5mg, 5mg QD     No current facility-administered medications on file prior to visit.         ROS:  Negative unless listed in the HPI    Physical Exam:   80 y.o. male  Body mass index is 23.68 kg/m²., 74.8 kg (165 lb)  Vitals:    03/19/25 1244   Resp: 20   SpO2: 97%     General: Alert, cooperative, appears well and in no observable distress.   HEENT: Normocephalic, atraumatic on external visual inspection. No icterus.   CV: No significant peripheral edema.    Respiratory: Normal respiratory effort.   Skin: Warm & well perfused; appropriate skin turgor.  Psych: Appropriate mood & affect.  Neuro: Gross sensation and motor intact in affected extremity/extremities.  Vascular: Peripheral pulses palpable in affected extremity/extremities.     Right Knee Exam     Muscle Strength   The patient has normal right knee strength.           Knee Musculoskeletal Exam  Gait    Gait is normal.    Inspection    Right      Erythema: none        Effusion: mild        Edema: none        Ecchymosis: none        Deformity: none        Alignment: normal      Palpation    Right      Right knee palpation is unremarkable.      Range of Motion    Right      Active extension: 0      Active flexion: 125    Strength    Right      Right knee strength is normal.      Instability    Right      Instability signs: none - stable      Radiology:  Narrative & Impression   XR KNEE 1 OR 2 VW RIGHT     Date of Exam: 3/17/2025 11:26 AM EDT     Indication: pain and swelling     Comparison: None available.     Findings:  No fracture or dislocation. Mild medial compartment joint space narrowing without significant spurring. Lateral compartment space is preserved without significant  spurring. Moderate narrowing of the patellofemoral compartment with small posterior   patellar osteophytes. Small suprapatellar effusion.     IMPRESSION:  Impression:  Small suprapatellar effusion.  Moderate patellofemoral compartment degenerative changes.  No acute osseous abnormality.        Electronically Signed: Rhea Rand MD    3/17/2025 11:39 AM EDT    Workstation ID: [id number]     Large Joint Arthrocentesis: R knee  Date/Time: 3/19/2025 10:26 AM  Consent given by: patient  Site marked: site marked  Timeout: Immediately prior to procedure a time out was called to verify the correct patient, procedure, equipment, support staff and site/side marked as required   Supporting Documentation  Indications: pain and diagnostic evaluation   Procedure Details  Location: knee - R knee  Preparation: Patient was prepped and draped in the usual sterile fashion  Needle size: 25 G  Approach: anterolateral  Medications administered: 2 mL lidocaine PF 1% 1 %; 80 mg triamcinolone acetonide 40 MG/ML  Patient tolerance: patient tolerated the procedure well with no immediate complications        Assessment:  Assessment & Plan  1. Right knee OA.  The patient reports significant pain and swelling in the right knee since last Friday, with no recollection of any specific injury. He has been using a knee brace provided by the urgent care center. Examination reveals mild warmth but no erythema or severe pain upon palpation. The x-ray taken at the urgent care center shows arthritis. Differential diagnosis includes an arthritis flare-up versus gout, but the absence of redness and severe inflammation makes gout less likely. A steroid injection into the right knee will be administered to alleviate the pain and inflammation.     Follow up in 4-6 weeks     PROCEDURE  A steroid injection was administered into the right knee to alleviate pain and inflammation.    Body mass index is 23.68 kg/m².  BMI consistent with Normal: 18.5-24.9kg/m2  BMI  is within normal parameters. No other follow-up for BMI required.    Patient encouraged to call with any questions or concerns in the interim    WILBERTO Cook     Patient or patient representative verbalized consent for the use of Ambient Listening during the visit with  WILBERTO Coko for chart documentation. 3/20/2025  10:21 EDT

## 2025-04-23 ENCOUNTER — OFFICE VISIT (OUTPATIENT)
Dept: ORTHOPEDIC SURGERY | Facility: CLINIC | Age: 80
End: 2025-04-23
Payer: COMMERCIAL

## 2025-04-23 VITALS — WEIGHT: 165 LBS | HEART RATE: 72 BPM | OXYGEN SATURATION: 96 % | HEIGHT: 70 IN | BODY MASS INDEX: 23.62 KG/M2

## 2025-04-23 DIAGNOSIS — M17.11 PRIMARY OSTEOARTHRITIS OF RIGHT KNEE: Primary | ICD-10-CM

## 2025-04-23 NOTE — PROGRESS NOTES
FOLLOW UP VISIT        Patient Name: Berto Cain  : 1945  Primary Care Physician: Berto Mcmanus MD        Chief Complaint:  right knee pain    HPI:   History of Present Illness  The patient is a 80-year-old male who presents for follow up evaluation of right knee pain.    Significant improvement in his right knee condition is reported following a steroid injection administered during his last visit. No discomfort was experienced at night post-injection, and no subsequent pain has occurred. Swelling in the affected area is absent.    Approximately a week ago, on 2025, he experienced a fall resulting in bruising on his hip and chest. The cause of the fall remains uncertain, but he recalls visiting a body shop to retrieve his truck while his wife remained in the car. He was directed to the office and remembers descending two steps but is unsure if he missed a step. Upon returning home, his palm had turned purple. Medical attention was sought from his primary care physician, who confirmed the absence of any fractures.              Past Medical/Surgical, Social and Family History:  I have reviewed and/or updated pertinent history as noted in the medical record including:  Past Medical History:   Diagnosis Date    Cancer     Leukemia    Chest pain, atypical 2020    Diabetes mellitus     History of cardiac monitoring 2018    Underlying NSR, Avg HR 91 bpm, 11 SVT runs with the fastest lasting 4 beats at 250 bpm.    History of echocardiogram 10/24/2018    TTE showed EF 60-65%, Normal RV, Mild LAE, TAVR leaflets appear to move well, mild paul-valvular regurg, mild MR/TR, RVSP 35 mmHg.    History of echocardiogram 2019    Mild concentric LVH, Mild MR/TR, TAVR in place with mild paravalvular AI, EF 61%    Hyperlipidemia     Hypertension     Lung nodules 2024    ISIDRA (obstructive sleep apnea)     Pulmonary hypertension     Stroke      Past Surgical History:   Procedure Laterality  Date    CARDIAC VALVE REPLACEMENT  06/02/2016    TAVR-Dr. Maldonado    HERNIA REPAIR Left 11/11/2024    LIVER BIOPSY      TONSILLECTOMY       Social History     Occupational History    Not on file   Tobacco Use    Smoking status: Never     Passive exposure: Never    Smokeless tobacco: Never   Vaping Use    Vaping status: Never Used   Substance and Sexual Activity    Alcohol use: No    Drug use: No    Sexual activity: Not Currently     Partners: Female      Social History     Social History Narrative    Not on file     Family History   Problem Relation Age of Onset    Heart failure Mother     Alzheimer's disease Father     Heart failure Brother        Allergies: No Known Allergies    Medications:   Home Medications:  Current Outpatient Medications on File Prior to Visit   Medication Sig    amLODIPine (NORVASC) 2.5 MG tablet Take 1 tablet by mouth Daily.    aspirin 81 MG EC tablet Take 1 tablet by mouth Daily.    atorvastatin (LIPITOR) 40 MG tablet Take 1 tablet by mouth Every Night.    hydrocortisone (CORTEF) 20 MG tablet Take 1 tablet by mouth Daily.    Inulin (FIBER CHOICE PO) Take  by mouth Daily.    Jantoven 5 MG tablet TAKE 1 1/2 TABLETS BY MOUTH DAILY ON MONDAY AND THURSDAY. TAKE ONE TABLET BY MOUTH ALL OTHER DAYS    metoprolol tartrate (LOPRESSOR) 25 MG tablet Take 1 tablet by mouth 2 (Two) Times a Day.    Mirabegron ER (MYRBETRIQ) 50 MG tablet sustained-release 24 hour 24 hr tablet Take 50 mg by mouth Daily.    Omega-3 Fatty Acids (fish oil) 1000 MG capsule capsule Take 2 capsules by mouth Daily With Breakfast.    pantoprazole (PROTONIX) 40 MG EC tablet Take 1 tablet by mouth Daily.    tamsulosin (FLOMAX) 0.4 MG capsule 24 hr capsule Take 1 capsule by mouth Daily.    Turmeric 500 MG capsule Take 1 capsule by mouth 2 (Two) Times a Day.    vitamin B-12 (CYANOCOBALAMIN) 100 MCG tablet Take 0.5 tablets by mouth Daily.    Vitamin D High Potency 25 MCG (1000 UT) capsule Take 1 capsule by mouth Daily.    warfarin  (COUMADIN) 2.5 MG tablet Take 3 tablets by mouth Every Night. M/thurs 2.5mg, 5mg QD    [DISCONTINUED] benzonatate (TESSALON) 100 MG capsule Take 2 capsules by mouth 3 (Three) Times a Day As Needed for Cough. (Patient not taking: Reported on 4/23/2025)     No current facility-administered medications on file prior to visit.         ROS:  14 point review of systems was negative except as listed in the HPI     Physical Exam:   80 y.o. male  Body mass index is 23.68 kg/m²., 74.8 kg (165 lb)  Vitals:    04/23/25 1256   Pulse: 72   SpO2: 96%         General: Alert, cooperative, appears well and in no observable distress.   HEENT: Normocephalic, atraumatic on external visual inspection. No icterus.   CV: No significant peripheral edema.   Respiratory: Normal respiratory effort.   Skin: Warm & well perfused; appropriate skin turgor.  Psych: Appropriate mood & affect.  Neuro: Gross sensation and motor intact in affected extremity/extremities.                Assessment:  Assessment & Plan  1. Right knee OA:  Steroid injection administered previously. No pain since the injection. Good range of motion without swelling.    If knee pain recurs, return for another injection, which can be administered every 90 days as needed.    2. Fall-related bruising:  Fall on 04/13/2025. Bruises on hip and chest. Primary care doctor confirmed no fractures.    Exercise caution to prevent future falls due to increased risk associated with age.    Follow-up:  Return if knee pain flares up for another injection.    Body mass index is 23.68 kg/m².  BMI consistent with Normal: 18.5-24.9kg/m2  BMI is within normal parameters. No other follow-up for BMI required.      Patient encouraged to call with questions or concerns in the interim      WILBERTO Cook     Patient or patient representative verbalized consent for the use of Ambient Listening during the visit with  WILBERTO Cook for chart documentation. 4/23/2025  13:19 EDT

## 2025-08-21 ENCOUNTER — OFFICE VISIT (OUTPATIENT)
Dept: CARDIOLOGY | Facility: CLINIC | Age: 80
End: 2025-08-21
Payer: MEDICARE

## 2025-08-21 VITALS
DIASTOLIC BLOOD PRESSURE: 74 MMHG | RESPIRATION RATE: 18 BRPM | WEIGHT: 162 LBS | OXYGEN SATURATION: 96 % | HEIGHT: 70 IN | SYSTOLIC BLOOD PRESSURE: 147 MMHG | HEART RATE: 58 BPM | BODY MASS INDEX: 23.19 KG/M2

## 2025-08-21 DIAGNOSIS — I10 PRIMARY HYPERTENSION: ICD-10-CM

## 2025-08-21 DIAGNOSIS — R06.09 DOE (DYSPNEA ON EXERTION): ICD-10-CM

## 2025-08-21 DIAGNOSIS — R07.89 CHEST PAIN, ATYPICAL: ICD-10-CM

## 2025-08-21 DIAGNOSIS — Z95.2 S/P TAVR (TRANSCATHETER AORTIC VALVE REPLACEMENT): Primary | ICD-10-CM

## 2025-08-21 DIAGNOSIS — I27.20 PULMONARY HYPERTENSION: ICD-10-CM

## 2025-08-21 DIAGNOSIS — I25.83 CORONARY ARTERY DISEASE DUE TO LIPID RICH PLAQUE: ICD-10-CM

## 2025-08-21 DIAGNOSIS — I25.10 CORONARY ARTERY DISEASE DUE TO LIPID RICH PLAQUE: ICD-10-CM

## 2025-08-21 DIAGNOSIS — E78.2 MIXED HYPERLIPIDEMIA: ICD-10-CM
